# Patient Record
Sex: MALE | Race: WHITE | NOT HISPANIC OR LATINO | URBAN - METROPOLITAN AREA
[De-identification: names, ages, dates, MRNs, and addresses within clinical notes are randomized per-mention and may not be internally consistent; named-entity substitution may affect disease eponyms.]

---

## 2018-09-10 ENCOUNTER — EMERGENCY (EMERGENCY)
Facility: HOSPITAL | Age: 43
LOS: 0 days | Discharge: HOME | End: 2018-09-10
Attending: EMERGENCY MEDICINE | Admitting: EMERGENCY MEDICINE
Payer: MEDICAID

## 2018-09-10 VITALS
RESPIRATION RATE: 18 BRPM | OXYGEN SATURATION: 98 % | DIASTOLIC BLOOD PRESSURE: 68 MMHG | HEART RATE: 86 BPM | TEMPERATURE: 98 F | SYSTOLIC BLOOD PRESSURE: 121 MMHG

## 2018-09-10 VITALS
WEIGHT: 240.08 LBS | SYSTOLIC BLOOD PRESSURE: 136 MMHG | RESPIRATION RATE: 18 BRPM | DIASTOLIC BLOOD PRESSURE: 75 MMHG | OXYGEN SATURATION: 100 % | HEART RATE: 83 BPM | TEMPERATURE: 97 F | HEIGHT: 73 IN

## 2018-09-10 DIAGNOSIS — R11.0 NAUSEA: ICD-10-CM

## 2018-09-10 DIAGNOSIS — H53.149 VISUAL DISCOMFORT, UNSPECIFIED: ICD-10-CM

## 2018-09-10 DIAGNOSIS — R51 HEADACHE: ICD-10-CM

## 2018-09-10 DIAGNOSIS — G40.909 EPILEPSY, UNSPECIFIED, NOT INTRACTABLE, WITHOUT STATUS EPILEPTICUS: ICD-10-CM

## 2018-09-10 LAB
ALBUMIN SERPL ELPH-MCNC: 4.3 G/DL — SIGNIFICANT CHANGE UP (ref 3.3–5)
ALP SERPL-CCNC: 137 U/L — HIGH (ref 40–120)
ALT FLD-CCNC: 35 U/L — SIGNIFICANT CHANGE UP (ref 12–78)
ANION GAP SERPL CALC-SCNC: 10 MMOL/L — SIGNIFICANT CHANGE UP (ref 5–17)
APTT BLD: 31.2 SEC — SIGNIFICANT CHANGE UP (ref 27.5–37.4)
AST SERPL-CCNC: 35 U/L — SIGNIFICANT CHANGE UP (ref 15–37)
BASOPHILS # BLD AUTO: 0.04 K/UL — SIGNIFICANT CHANGE UP (ref 0–0.2)
BASOPHILS NFR BLD AUTO: 0.5 % — SIGNIFICANT CHANGE UP (ref 0–2)
BILIRUB SERPL-MCNC: 1.3 MG/DL — HIGH (ref 0.2–1.2)
BUN SERPL-MCNC: 18 MG/DL — SIGNIFICANT CHANGE UP (ref 7–23)
CALCIUM SERPL-MCNC: 9.3 MG/DL — SIGNIFICANT CHANGE UP (ref 8.5–10.1)
CHLORIDE SERPL-SCNC: 105 MMOL/L — SIGNIFICANT CHANGE UP (ref 96–108)
CO2 SERPL-SCNC: 23 MMOL/L — SIGNIFICANT CHANGE UP (ref 22–31)
CREAT SERPL-MCNC: 1.09 MG/DL — SIGNIFICANT CHANGE UP (ref 0.5–1.3)
EOSINOPHIL # BLD AUTO: 0.04 K/UL — SIGNIFICANT CHANGE UP (ref 0–0.5)
EOSINOPHIL NFR BLD AUTO: 0.5 % — SIGNIFICANT CHANGE UP (ref 0–6)
GLUCOSE SERPL-MCNC: 145 MG/DL — HIGH (ref 70–99)
HCT VFR BLD CALC: 48.1 % — SIGNIFICANT CHANGE UP (ref 39–50)
HGB BLD-MCNC: 15.9 G/DL — SIGNIFICANT CHANGE UP (ref 13–17)
IMM GRANULOCYTES NFR BLD AUTO: 0.5 % — SIGNIFICANT CHANGE UP (ref 0–1.5)
INR BLD: 1.05 RATIO — SIGNIFICANT CHANGE UP (ref 0.88–1.16)
LYMPHOCYTES # BLD AUTO: 0.63 K/UL — LOW (ref 1–3.3)
LYMPHOCYTES # BLD AUTO: 8.1 % — LOW (ref 13–44)
MCHC RBC-ENTMCNC: 27.6 PG — SIGNIFICANT CHANGE UP (ref 27–34)
MCHC RBC-ENTMCNC: 33.1 GM/DL — SIGNIFICANT CHANGE UP (ref 32–36)
MCV RBC AUTO: 83.5 FL — SIGNIFICANT CHANGE UP (ref 80–100)
MONOCYTES # BLD AUTO: 0.33 K/UL — SIGNIFICANT CHANGE UP (ref 0–0.9)
MONOCYTES NFR BLD AUTO: 4.2 % — SIGNIFICANT CHANGE UP (ref 2–14)
NEUTROPHILS # BLD AUTO: 6.7 K/UL — SIGNIFICANT CHANGE UP (ref 1.8–7.4)
NEUTROPHILS NFR BLD AUTO: 86.2 % — HIGH (ref 43–77)
NRBC # BLD: 0 /100 WBCS — SIGNIFICANT CHANGE UP (ref 0–0)
PLATELET # BLD AUTO: 156 K/UL — SIGNIFICANT CHANGE UP (ref 150–400)
POTASSIUM SERPL-MCNC: 4.6 MMOL/L — SIGNIFICANT CHANGE UP (ref 3.5–5.3)
POTASSIUM SERPL-SCNC: 4.6 MMOL/L — SIGNIFICANT CHANGE UP (ref 3.5–5.3)
PROT SERPL-MCNC: 7.7 GM/DL — SIGNIFICANT CHANGE UP (ref 6–8.3)
PROTHROM AB SERPL-ACNC: 11.3 SEC — SIGNIFICANT CHANGE UP (ref 9.8–12.7)
RBC # BLD: 5.76 M/UL — SIGNIFICANT CHANGE UP (ref 4.2–5.8)
RBC # FLD: 12.8 % — SIGNIFICANT CHANGE UP (ref 10.3–14.5)
SODIUM SERPL-SCNC: 138 MMOL/L — SIGNIFICANT CHANGE UP (ref 135–145)
WBC # BLD: 7.78 K/UL — SIGNIFICANT CHANGE UP (ref 3.8–10.5)
WBC # FLD AUTO: 7.78 K/UL — SIGNIFICANT CHANGE UP (ref 3.8–10.5)

## 2018-09-10 PROCEDURE — 70450 CT HEAD/BRAIN W/O DYE: CPT | Mod: 26

## 2018-09-10 PROCEDURE — 99285 EMERGENCY DEPT VISIT HI MDM: CPT

## 2018-09-10 RX ORDER — CYCLOBENZAPRINE HYDROCHLORIDE 10 MG/1
1 TABLET, FILM COATED ORAL
Qty: 15 | Refills: 0
Start: 2018-09-10 | End: 2018-09-14

## 2018-09-10 RX ORDER — SODIUM CHLORIDE 9 MG/ML
3 INJECTION INTRAMUSCULAR; INTRAVENOUS; SUBCUTANEOUS ONCE
Qty: 0 | Refills: 0 | Status: COMPLETED | OUTPATIENT
Start: 2018-09-10 | End: 2018-09-10

## 2018-09-10 RX ORDER — IBUPROFEN 200 MG
1 TABLET ORAL
Qty: 40 | Refills: 0
Start: 2018-09-10 | End: 2018-09-19

## 2018-09-10 RX ORDER — SODIUM CHLORIDE 9 MG/ML
1000 INJECTION INTRAMUSCULAR; INTRAVENOUS; SUBCUTANEOUS ONCE
Qty: 0 | Refills: 0 | Status: COMPLETED | OUTPATIENT
Start: 2018-09-10 | End: 2018-09-10

## 2018-09-10 RX ORDER — METOCLOPRAMIDE HCL 10 MG
10 TABLET ORAL ONCE
Qty: 0 | Refills: 0 | Status: COMPLETED | OUTPATIENT
Start: 2018-09-10 | End: 2018-09-10

## 2018-09-10 RX ORDER — KETOROLAC TROMETHAMINE 30 MG/ML
30 SYRINGE (ML) INJECTION ONCE
Qty: 0 | Refills: 0 | Status: DISCONTINUED | OUTPATIENT
Start: 2018-09-10 | End: 2018-09-10

## 2018-09-10 RX ADMIN — SODIUM CHLORIDE 1000 MILLILITER(S): 9 INJECTION INTRAMUSCULAR; INTRAVENOUS; SUBCUTANEOUS at 03:39

## 2018-09-10 RX ADMIN — Medication 30 MILLIGRAM(S): at 03:49

## 2018-09-10 RX ADMIN — Medication 10 MILLIGRAM(S): at 03:56

## 2018-09-10 RX ADMIN — SODIUM CHLORIDE 3 MILLILITER(S): 9 INJECTION INTRAMUSCULAR; INTRAVENOUS; SUBCUTANEOUS at 03:39

## 2018-09-10 NOTE — ED ADULT NURSE NOTE - OBJECTIVE STATEMENT
pt c/o mid frontal headache, +photophobia, + nausea since yesterday; took his prescribed meds (from neurologist) but did not help

## 2018-09-10 NOTE — ED PROVIDER NOTE - OBJECTIVE STATEMENT
43 y/o male in ED c/o intermittent worsening frontal HA with nausea x 3 days.   pt states on migraine meds by his neurologist but not working.   pt denies any fever, neck pain, cp, sob, v/d/abd pain.   states positive photophobia.

## 2018-09-10 NOTE — ED ADULT NURSE NOTE - NSIMPLEMENTINTERV_GEN_ALL_ED
Implemented All Universal Safety Interventions:  Thor to call system. Call bell, personal items and telephone within reach. Instruct patient to call for assistance. Room bathroom lighting operational. Non-slip footwear when patient is off stretcher. Physically safe environment: no spills, clutter or unnecessary equipment. Stretcher in lowest position, wheels locked, appropriate side rails in place.

## 2019-06-17 PROBLEM — R56.9 UNSPECIFIED CONVULSIONS: Chronic | Status: ACTIVE | Noted: 2018-09-10

## 2019-06-20 ENCOUNTER — APPOINTMENT (OUTPATIENT)
Dept: RHEUMATOLOGY | Facility: CLINIC | Age: 44
End: 2019-06-20
Payer: COMMERCIAL

## 2019-06-20 VITALS
HEART RATE: 72 BPM | DIASTOLIC BLOOD PRESSURE: 82 MMHG | HEIGHT: 72 IN | OXYGEN SATURATION: 98 % | SYSTOLIC BLOOD PRESSURE: 120 MMHG | BODY MASS INDEX: 36.03 KG/M2 | WEIGHT: 266 LBS

## 2019-06-20 DIAGNOSIS — M25.50 PAIN IN UNSPECIFIED JOINT: ICD-10-CM

## 2019-06-20 DIAGNOSIS — Z84.0 FAMILY HISTORY OF DISEASES OF THE SKIN AND SUBCUTANEOUS TISSUE: ICD-10-CM

## 2019-06-20 DIAGNOSIS — Z86.69 PERSONAL HISTORY OF OTHER DISEASES OF THE NERVOUS SYSTEM AND SENSE ORGANS: ICD-10-CM

## 2019-06-20 PROBLEM — Z00.00 ENCOUNTER FOR PREVENTIVE HEALTH EXAMINATION: Status: ACTIVE | Noted: 2019-06-20

## 2019-06-20 PROCEDURE — 36415 COLL VENOUS BLD VENIPUNCTURE: CPT

## 2019-06-20 PROCEDURE — 99204 OFFICE O/P NEW MOD 45 MIN: CPT | Mod: 25

## 2019-06-20 RX ORDER — SUMATRIPTAN 50 MG/1
TABLET, FILM COATED ORAL
Refills: 0 | Status: ACTIVE | COMMUNITY

## 2019-06-23 LAB
25(OH)D3 SERPL-MCNC: 24.6 NG/ML
ALBUMIN SERPL ELPH-MCNC: 4.9 G/DL
ALP BLD-CCNC: 97 U/L
ALT SERPL-CCNC: 19 U/L
ANION GAP SERPL CALC-SCNC: 14 MMOL/L
AST SERPL-CCNC: 17 U/L
BASOPHILS # BLD AUTO: 0.06 K/UL
BASOPHILS NFR BLD AUTO: 1.3 %
BILIRUB SERPL-MCNC: 1 MG/DL
BUN SERPL-MCNC: 24 MG/DL
CALCIUM SERPL-MCNC: 9.4 MG/DL
CHLORIDE SERPL-SCNC: 104 MMOL/L
CK SERPL-CCNC: 57 U/L
CO2 SERPL-SCNC: 25 MMOL/L
CREAT SERPL-MCNC: 0.97 MG/DL
CRP SERPL-MCNC: 0.11 MG/DL
ENA SS-A AB SER IA-ACNC: <0.2 AL
ENA SS-B AB SER IA-ACNC: <0.2 AL
EOSINOPHIL # BLD AUTO: 0.16 K/UL
EOSINOPHIL NFR BLD AUTO: 3.4 %
ERYTHROCYTE [SEDIMENTATION RATE] IN BLOOD BY WESTERGREN METHOD: 4 MM/HR
G6PD SER-CCNC: 14.2 U/G HGB
GLUCOSE SERPL-MCNC: 110 MG/DL
HBV SURFACE AG SER QL: NONREACTIVE
HCT VFR BLD CALC: 51.7 %
HCV AB SER QL: NONREACTIVE
HCV S/CO RATIO: 0.38 S/CO
HGB BLD-MCNC: 15.7 G/DL
IMM GRANULOCYTES NFR BLD AUTO: 0.4 %
LYMPHOCYTES # BLD AUTO: 0.99 K/UL
LYMPHOCYTES NFR BLD AUTO: 20.8 %
MAN DIFF?: NORMAL
MCHC RBC-ENTMCNC: 27.9 PG
MCHC RBC-ENTMCNC: 30.4 GM/DL
MCV RBC AUTO: 91.8 FL
MONOCYTES # BLD AUTO: 0.39 K/UL
MONOCYTES NFR BLD AUTO: 8.2 %
NEUTROPHILS # BLD AUTO: 3.14 K/UL
NEUTROPHILS NFR BLD AUTO: 65.9 %
PLATELET # BLD AUTO: 187 K/UL
POTASSIUM SERPL-SCNC: 4.4 MMOL/L
PROT SERPL-MCNC: 6.9 G/DL
RBC # BLD: 5.63 M/UL
RBC # FLD: 13.4 %
RHEUMATOID FACT SER QL: <10 IU/ML
SODIUM SERPL-SCNC: 143 MMOL/L
TSH SERPL-ACNC: 2.04 UIU/ML
WBC # FLD AUTO: 4.76 K/UL

## 2019-06-23 NOTE — REVIEW OF SYSTEMS
[As Noted in HPI] : as noted in HPI [Chills] : no chills [Fever] : no fever [Earache] : no earache [Eye Pain] : no eye pain [Red Eyes] : eyes not red [Nosebleeds] : no nosebleeds [Chest Pain] : no chest pain [Shortness Of Breath] : no shortness of breath [Abdominal Pain] : no abdominal pain [Dysuria] : no dysuria [Vomiting] : no vomiting [Confused] : no confusion [Suicidal] : not suicidal [Fainting] : no fainting [Muscle Weakness] : no muscle weakness [Proptosis] : no proptosis [Easy Bleeding] : no tendency for easy bleeding

## 2019-06-23 NOTE — HISTORY OF PRESENT ILLNESS
[FreeTextEntry1] : 43-year-old male here for the first time. Patient states that he has family history of psoriasis as well as maternal cousin w/ lupus. Patient states that he was told he has psoriasis rash at the scrotum per dermatologist in the past & perhaps at the elbows. States that the scrotum psoriasis is controlled with steroids and not much at the elbows at this time.\par States he notices a rash around his abdomen that improved and then for the past 2 months noted right lower extremity itchy rash not spreading improving and told likely folliculitis versus dermatitis by his Derm, Dr. Osorio in [place] with some ointment given and improving.\par This rash is not raised or petechia like at all.\par States he can notice some intermittent pain in his neck for the past few years without any paresthesias.\par States he can notice some right shoulder pain with rotation intermittently and has had history of dislocations of it in the past.\par States he notes some intermittent knee pain on and off without any swelling or redness or warmth of it.\par States he will monitor for any swelling or stiffness.\par Denies any lower back pain at all.\par States his has history of headaches and sees Dr. Jurado on triptan PRN then as well as on lamotrigene for history of seizures more than 10 years.\par Denies any history of blood clots or stroke.\par States he notices some intermittent headaches that do respond to analgesics including Advil and Excedrin.\par Denies any fever/chills, no ulcers, no dry eyes, + dry mouth at times, no raynaud's, no  symptoms at this time.\par States he has hx of IBS w/ GI and no IBD. \par

## 2019-06-23 NOTE — ASSESSMENT
[FreeTextEntry1] : 43-year-old male here for the first time w/ FH of CTD/ Psoriasis & states told in the past he has mild psoriasis of the scrotum w/ prior Derms as well as some Rt LE folliculitis/ dermatitis improving rash w/ Derm, Dr. Osorio. States he notes some arthralgias at times; intermittent neck pain; Rt shoulder pain & wants to be ruled out for any CTD/ inflammatory arthropathy.\par -No synovitis or dactylitis or enthesitis or effusion or LBP on exam noted today and advised to monitor & alert us if any concerns.\par -Rt LE folliculitis/ dermatitis: improving rash w/ Derm, Dr. Osorio & discussed he can get biopsy if it was not improving. Clinically does not look like a vasculitis rash at this time and knows to alert us if changes as discussed.\par -labs as below incld ESR, CRP, serologies, lyme, TSH\par \par Cervicalgia: tenderness in c-spine w/ rotation w/ good ROM w/o paresthesias \par -Referred for xray of c-spine to evaluate for structural changes, DDD\par \par Rt shoulder pain: intermittent and hx of dislocations of it  \par -Has full ROM in b/l shoulders, no pelvic/girdle stiffness and able to stand up without using her hands, no temporal pain/unremitting headaches, no vision changes, no jaw pain.\par -Referred for xray of left shoulder to evaluate for structural changes, eval for calcific tendonitis, high riding humerus/rotator cuff pathology\par -discussed he can consider steroid injection if needed\par \par -educated on symptoms to monitor for in detail and alert us if any concerns.\par -f/u in 10-14days w/ labs, xrays please\par

## 2019-06-23 NOTE — PHYSICAL EXAM
[General Appearance - Alert] : alert [General Appearance - Well Nourished] : well nourished [Sclera] : the sclera and conjunctiva were normal [Extraocular Movements] : extraocular movements were intact [Outer Ear] : the ears and nose were normal in appearance [Nasal Cavity] : the nasal mucosa and septum were normal [Neck Appearance] : the appearance of the neck was normal [] : the neck was supple [Auscultation Breath Sounds / Voice Sounds] : lungs were clear to auscultation bilaterally [Respiration, Rhythm And Depth] : normal respiratory rhythm and effort [Heart Rate And Rhythm] : heart rate was normal and rhythm regular [Heart Sounds] : normal S1 and S2 [Bowel Sounds] : normal bowel sounds [Abdomen Soft] : soft [Abdomen Tenderness] : non-tender [Cervical Lymph Nodes Enlarged Anterior Bilaterally] : anterior cervical [Supraclavicular Lymph Nodes Enlarged Bilaterally] : supraclavicular [No CVA Tenderness] : no ~M costovertebral angle tenderness [Motor Tone] : muscle strength and tone were normal [Cranial Nerves] : cranial nerves 2-12 were intact [Impaired Insight] : insight and judgment were intact [No Focal Deficits] : no focal deficits [Mood] : the mood was normal [FreeTextEntry1] : some excoriations noted on Rt LE and mildly at elbows

## 2019-06-23 NOTE — REASON FOR VISIT
[Consultation] : a consultation visit [FreeTextEntry1] : hx of psoriasis; neck pain; Rt shoulder pain; FH of psoriasis/SLE

## 2019-07-02 LAB
ANA SER IF-ACNC: NEGATIVE
CCP AB SER IA-ACNC: <8 UNITS
HLA-B27 RELATED AG QL: NORMAL
RF+CCP IGG SER-IMP: NEGATIVE

## 2019-07-16 LAB

## 2019-09-04 ENCOUNTER — APPOINTMENT (OUTPATIENT)
Dept: RHEUMATOLOGY | Facility: CLINIC | Age: 44
End: 2019-09-04
Payer: COMMERCIAL

## 2019-09-04 VITALS
BODY MASS INDEX: 37.11 KG/M2 | SYSTOLIC BLOOD PRESSURE: 102 MMHG | OXYGEN SATURATION: 98 % | DIASTOLIC BLOOD PRESSURE: 72 MMHG | HEIGHT: 72 IN | HEART RATE: 64 BPM | WEIGHT: 274 LBS

## 2019-09-04 DIAGNOSIS — M19.042 PRIMARY OSTEOARTHRITIS, RIGHT HAND: ICD-10-CM

## 2019-09-04 DIAGNOSIS — Z87.2 PERSONAL HISTORY OF DISEASES OF THE SKIN AND SUBCUTANEOUS TISSUE: ICD-10-CM

## 2019-09-04 DIAGNOSIS — M25.511 PAIN IN RIGHT SHOULDER: ICD-10-CM

## 2019-09-04 DIAGNOSIS — M19.041 PRIMARY OSTEOARTHRITIS, RIGHT HAND: ICD-10-CM

## 2019-09-04 DIAGNOSIS — E55.9 VITAMIN D DEFICIENCY, UNSPECIFIED: ICD-10-CM

## 2019-09-04 DIAGNOSIS — M54.2 CERVICALGIA: ICD-10-CM

## 2019-09-04 PROCEDURE — 99214 OFFICE O/P EST MOD 30 MIN: CPT

## 2019-09-04 RX ORDER — CHOLECALCIFEROL (VITAMIN D3) 1250 MCG
1.25 MG CAPSULE ORAL
Qty: 4 | Refills: 1 | Status: ACTIVE | COMMUNITY
Start: 2019-09-04 | End: 1900-01-01

## 2019-09-04 NOTE — HISTORY OF PRESENT ILLNESS
[FreeTextEntry1] : 43-year-old male here for the first follow up to review labs  &states he forgot to do xrays. Patient states that he has family history of psoriasis as well as maternal cousin w/ lupus. Patient states that he was told he has psoriasis rash at the scrotum per dermatologist in the past & perhaps at the elbows. States that the scrotum psoriasis is controlled with steroids and not much at the elbows at this time.\par States he notices a rash around his abdomen that improved and then for the past 2 months noted right lower extremity itchy rash not spreading improving and told likely folliculitis versus dermatitis by his Derm, Dr. Osorio in [place] with some ointment given and resolved today.\par States he can notice some intermittent pain in his neck for the past few years without any paresthesias.\par States he can notice some right shoulder pain with rotation intermittently and has had history of dislocations of it in the past and no pain in it today. \par States he notes some intermittent knee pain on and off without any swelling or redness or warmth of it & not today.\par Denies any lower back pain at all.\par States his has history of headaches and sees Dr. Jurado on triptan PRN then as well as on lamotrigene for history of seizures more than 10 years.\par Denies any history of blood clots or stroke.\par States he notices some intermittent headaches that do respond to analgesics including Advil and Excedrin.\par Denies any fever/chills, no ulcers, no dry eyes, + dry mouth at times, no raynaud's, no  symptoms at this time.\par States he has hx of IBS w/ GI and no IBD. \par \par

## 2019-09-04 NOTE — ASSESSMENT
[FreeTextEntry1] : 43-year-old male here for first follow up w/ FH of CTD/ Psoriasis & states told in the past he has mild psoriasis of the scrotum/elbow w/ prior Derms as well as some Rt LE folliculitis/ dermatitis resolved now. \par -No synovitis or dactylitis or enthesitis or effusion or LBP on exam noted today and advised to monitor & alert us if any concerns.\par -Rt LE folliculitis/ dermatitis: resolved rash now & clinically did not look like a vasculitis rash at this time and knows to alert us if changes as discussed.\par -labs as below 6/20/2019 incld normal ESR/ CRP, serologies all stable at this time as discussed in detail; low vit D \par \par OA hands: noted to have heberden nodes at DIPs with PIP hypertrophy without pain at this time and will monitor.\par \par Hypovitaminosis D: low vitD w/ prescription for vitD repletion sent as discussed.\par \par Cervicalgia: resolved today \par -has referral xray of c-spine to evaluate for structural changes, DDD if pain as discussed \par \par Rt shoulder pain: intermittent and hx of dislocations of it \par -Has full ROM in b/l shoulders, no pelvic/girdle stiffness and able to stand up without using her hands, no temporal pain/unremitting headaches, no vision changes, no jaw pain.\par -discussed he can consider steroid injection if needed\par \par -educated on symptoms to monitor for in detail and alert us if any concerns.\par -f/u in 1 yr PRN

## 2019-09-04 NOTE — REASON FOR VISIT
[Follow-Up: _____] : a [unfilled] follow-up visit [FreeTextEntry1] : hx of psoriasis; intermittent neck pain/aches; Rt shoulder pain; FH of psoriasis/SLE; review labs/symptoms \par  \par

## 2019-09-04 NOTE — PHYSICAL EXAM
[General Appearance - Alert] : alert [General Appearance - Well Nourished] : well nourished [Sclera] : the sclera and conjunctiva were normal [Extraocular Movements] : extraocular movements were intact [Outer Ear] : the ears and nose were normal in appearance [Nasal Cavity] : the nasal mucosa and septum were normal [Neck Appearance] : the appearance of the neck was normal [] : the neck was supple [Respiration, Rhythm And Depth] : normal respiratory rhythm and effort [Auscultation Breath Sounds / Voice Sounds] : lungs were clear to auscultation bilaterally [Heart Sounds] : normal S1 and S2 [Heart Rate And Rhythm] : heart rate was normal and rhythm regular [Bowel Sounds] : normal bowel sounds [Abdomen Soft] : soft [Abdomen Tenderness] : non-tender [Supraclavicular Lymph Nodes Enlarged Bilaterally] : supraclavicular [Cervical Lymph Nodes Enlarged Anterior Bilaterally] : anterior cervical [No CVA Tenderness] : no ~M costovertebral angle tenderness [No Spinal Tenderness] : no spinal tenderness [Motor Tone] : muscle strength and tone were normal [Cranial Nerves] : cranial nerves 2-12 were intact [No Focal Deficits] : no focal deficits [Impaired Insight] : insight and judgment were intact [Mood] : the mood was normal [FreeTextEntry1] : mild redness at left elbow

## 2020-10-22 ENCOUNTER — OUTPATIENT (OUTPATIENT)
Dept: OUTPATIENT SERVICES | Facility: HOSPITAL | Age: 45
LOS: 1 days | End: 2020-10-22
Payer: COMMERCIAL

## 2020-10-22 DIAGNOSIS — G40.109 LOCALIZATION-RELATED (FOCAL) (PARTIAL) SYMPTOMATIC EPILEPSY AND EPILEPTIC SYNDROMES WITH SIMPLE PARTIAL SEIZURES, NOT INTRACTABLE, WITHOUT STATUS EPILEPTICUS: ICD-10-CM

## 2020-10-22 PROCEDURE — 95816 EEG AWAKE AND DROWSY: CPT

## 2020-10-22 PROCEDURE — 95816 EEG AWAKE AND DROWSY: CPT | Mod: 26

## 2020-10-23 DIAGNOSIS — G40.109 LOCALIZATION-RELATED (FOCAL) (PARTIAL) SYMPTOMATIC EPILEPSY AND EPILEPTIC SYNDROMES WITH SIMPLE PARTIAL SEIZURES, NOT INTRACTABLE, WITHOUT STATUS EPILEPTICUS: ICD-10-CM

## 2021-09-05 ENCOUNTER — EMERGENCY (EMERGENCY)
Facility: HOSPITAL | Age: 46
LOS: 0 days | Discharge: HOME | End: 2021-09-05
Attending: EMERGENCY MEDICINE
Payer: COMMERCIAL

## 2021-09-05 VITALS
TEMPERATURE: 99 F | WEIGHT: 244.93 LBS | OXYGEN SATURATION: 100 % | RESPIRATION RATE: 16 BRPM | DIASTOLIC BLOOD PRESSURE: 75 MMHG | HEART RATE: 67 BPM | SYSTOLIC BLOOD PRESSURE: 122 MMHG | HEIGHT: 73 IN

## 2021-09-05 VITALS
DIASTOLIC BLOOD PRESSURE: 72 MMHG | SYSTOLIC BLOOD PRESSURE: 135 MMHG | HEART RATE: 70 BPM | OXYGEN SATURATION: 100 % | RESPIRATION RATE: 18 BRPM | TEMPERATURE: 98 F

## 2021-09-05 DIAGNOSIS — Z87.19 PERSONAL HISTORY OF OTHER DISEASES OF THE DIGESTIVE SYSTEM: ICD-10-CM

## 2021-09-05 DIAGNOSIS — K80.20 CALCULUS OF GALLBLADDER WITHOUT CHOLECYSTITIS WITHOUT OBSTRUCTION: ICD-10-CM

## 2021-09-05 DIAGNOSIS — G40.909 EPILEPSY, UNSPECIFIED, NOT INTRACTABLE, WITHOUT STATUS EPILEPTICUS: ICD-10-CM

## 2021-09-05 DIAGNOSIS — R10.9 UNSPECIFIED ABDOMINAL PAIN: ICD-10-CM

## 2021-09-05 DIAGNOSIS — R10.11 RIGHT UPPER QUADRANT PAIN: ICD-10-CM

## 2021-09-05 LAB
ADD ON TEST-SPECIMEN IN LAB: SIGNIFICANT CHANGE UP
ALBUMIN SERPL ELPH-MCNC: 4.4 G/DL — SIGNIFICANT CHANGE UP (ref 3.3–5)
ALP SERPL-CCNC: 169 U/L — HIGH (ref 40–120)
ALT FLD-CCNC: 115 U/L — HIGH (ref 12–78)
ANION GAP SERPL CALC-SCNC: 3 MMOL/L — LOW (ref 5–17)
AST SERPL-CCNC: 151 U/L — HIGH (ref 15–37)
BASOPHILS # BLD AUTO: 0.03 K/UL — SIGNIFICANT CHANGE UP (ref 0–0.2)
BASOPHILS NFR BLD AUTO: 0.5 % — SIGNIFICANT CHANGE UP (ref 0–2)
BILIRUB SERPL-MCNC: 2.4 MG/DL — HIGH (ref 0.2–1.2)
BUN SERPL-MCNC: 18 MG/DL — SIGNIFICANT CHANGE UP (ref 7–23)
CALCIUM SERPL-MCNC: 8.9 MG/DL — SIGNIFICANT CHANGE UP (ref 8.5–10.1)
CHLORIDE SERPL-SCNC: 108 MMOL/L — SIGNIFICANT CHANGE UP (ref 96–108)
CO2 SERPL-SCNC: 28 MMOL/L — SIGNIFICANT CHANGE UP (ref 22–31)
CREAT SERPL-MCNC: 1.02 MG/DL — SIGNIFICANT CHANGE UP (ref 0.5–1.3)
EOSINOPHIL # BLD AUTO: 0.07 K/UL — SIGNIFICANT CHANGE UP (ref 0–0.5)
EOSINOPHIL NFR BLD AUTO: 1.1 % — SIGNIFICANT CHANGE UP (ref 0–6)
GLUCOSE SERPL-MCNC: 89 MG/DL — SIGNIFICANT CHANGE UP (ref 70–99)
HCT VFR BLD CALC: 45.3 % — SIGNIFICANT CHANGE UP (ref 39–50)
HGB BLD-MCNC: 15 G/DL — SIGNIFICANT CHANGE UP (ref 13–17)
IMM GRANULOCYTES NFR BLD AUTO: 0.3 % — SIGNIFICANT CHANGE UP (ref 0–1.5)
LIDOCAIN IGE QN: 159 U/L — SIGNIFICANT CHANGE UP (ref 73–393)
LYMPHOCYTES # BLD AUTO: 0.84 K/UL — LOW (ref 1–3.3)
LYMPHOCYTES # BLD AUTO: 12.8 % — LOW (ref 13–44)
MCHC RBC-ENTMCNC: 28 PG — SIGNIFICANT CHANGE UP (ref 27–34)
MCHC RBC-ENTMCNC: 33.1 GM/DL — SIGNIFICANT CHANGE UP (ref 32–36)
MCV RBC AUTO: 84.5 FL — SIGNIFICANT CHANGE UP (ref 80–100)
MONOCYTES # BLD AUTO: 0.56 K/UL — SIGNIFICANT CHANGE UP (ref 0–0.9)
MONOCYTES NFR BLD AUTO: 8.5 % — SIGNIFICANT CHANGE UP (ref 2–14)
NEUTROPHILS # BLD AUTO: 5.06 K/UL — SIGNIFICANT CHANGE UP (ref 1.8–7.4)
NEUTROPHILS NFR BLD AUTO: 76.8 % — SIGNIFICANT CHANGE UP (ref 43–77)
PLATELET # BLD AUTO: 172 K/UL — SIGNIFICANT CHANGE UP (ref 150–400)
POTASSIUM SERPL-MCNC: 4.6 MMOL/L — SIGNIFICANT CHANGE UP (ref 3.5–5.3)
POTASSIUM SERPL-SCNC: 4.6 MMOL/L — SIGNIFICANT CHANGE UP (ref 3.5–5.3)
PROT SERPL-MCNC: 7.4 GM/DL — SIGNIFICANT CHANGE UP (ref 6–8.3)
RBC # BLD: 5.36 M/UL — SIGNIFICANT CHANGE UP (ref 4.2–5.8)
RBC # FLD: 13.2 % — SIGNIFICANT CHANGE UP (ref 10.3–14.5)
SODIUM SERPL-SCNC: 139 MMOL/L — SIGNIFICANT CHANGE UP (ref 135–145)
WBC # BLD: 6.58 K/UL — SIGNIFICANT CHANGE UP (ref 3.8–10.5)
WBC # FLD AUTO: 6.58 K/UL — SIGNIFICANT CHANGE UP (ref 3.8–10.5)

## 2021-09-05 PROCEDURE — 76705 ECHO EXAM OF ABDOMEN: CPT

## 2021-09-05 PROCEDURE — 76705 ECHO EXAM OF ABDOMEN: CPT | Mod: 26

## 2021-09-05 PROCEDURE — 80053 COMPREHEN METABOLIC PANEL: CPT

## 2021-09-05 PROCEDURE — 36415 COLL VENOUS BLD VENIPUNCTURE: CPT

## 2021-09-05 PROCEDURE — 85025 COMPLETE CBC W/AUTO DIFF WBC: CPT

## 2021-09-05 PROCEDURE — 83690 ASSAY OF LIPASE: CPT

## 2021-09-05 PROCEDURE — 99285 EMERGENCY DEPT VISIT HI MDM: CPT

## 2021-09-05 PROCEDURE — 99284 EMERGENCY DEPT VISIT MOD MDM: CPT | Mod: 25

## 2021-09-05 PROCEDURE — 82248 BILIRUBIN DIRECT: CPT

## 2021-09-05 RX ORDER — IBUPROFEN 200 MG
1 TABLET ORAL
Qty: 20 | Refills: 0
Start: 2021-09-05 | End: 2021-09-09

## 2021-09-05 RX ORDER — CYCLOBENZAPRINE HYDROCHLORIDE 10 MG/1
1 TABLET, FILM COATED ORAL
Qty: 15 | Refills: 0
Start: 2021-09-05 | End: 2021-09-09

## 2021-09-05 NOTE — CONSULT NOTE ADULT - SUBJECTIVE AND OBJECTIVE BOX
46 y/o M with a PMHx of seizure, gallstones presents to the ED c/o abd pain around 1030 AM today. States that he has been having episodes of abd pain chronically, but states that today's episode has been persistent so her presents to the ED for further evaluation.   No fever. No hx of abd surgeries.  Pt told to come to the ED to get an ultrasound. U/s showed: Cholelithiasis. no signs of acute cholecystitis .    ICU Vital Signs Last 24 Hrs  T(C): 37 (05 Sep 2021 13:23), Max: 37 (05 Sep 2021 13:23)  T(F): 98.6 (05 Sep 2021 13:23), Max: 98.6 (05 Sep 2021 13:23)  HR: 67 (05 Sep 2021 13:23) (67 - 67)  BP: 122/75 (05 Sep 2021 13:23) (122/75 - 122/75)  BP(mean): 86 (05 Sep 2021 13:23) (86 - 86)  ABP: --  ABP(mean): --  RR: 16 (05 Sep 2021 13:23) (16 - 16)  SpO2: 100% (05 Sep 2021 13:23) (100% - 100%)    · CONSTITUTIONAL: well appearing and in no apparent distress.  · EYES: clear bilaterally.  Pupils equal, round, and reactive to light.  · ENMT: Nasal mucosa clear.  Mouth with normal mucosa  Throat has no vesicles, no oropharyngeal exudates and uvula is midline.  · CARDIAC: normal rate, regular rhythm, and no murmur.  · RESPIRATORY: breath sounds clear and equal bilaterally.  · GASTROINTESTINAL: abdomen soft and non-distended. Kohler's sign negative.                           15.0   6.58  )-----------( 172      ( 05 Sep 2021 14:32 )             45.3   09-05    139  |  108  |  18  ----------------------------<  89  4.6   |  28  |  1.02    Ca    8.9      05 Sep 2021 14:32        TPro  7.4  /  Alb  4.4  /  TBili  2.4<H>  /  DBili  0.6<H>  /  AST  151<H>  /  ALT  115<H>  /  AlkPhos  169<H>  09-05    Imaging:        EXAM: US ABDOMEN RT UPR QUADRANT      PROCEDURE DATE: 09/05/2021        INTERPRETATION: CLINICAL INFORMATION: Right upper quadrant pain    COMPARISON: None available.    TECHNIQUE: Sonography of the right upper quadrant.    FINDINGS:    Liver: Within normal limits.  Bile ducts: Normal caliber. Common bile duct measures 4 mm.  Gallbladder: The gallbladder contains multiple shadowing gallstones. The visualized wall demonstrates normal thickness. No pericholecystic fluid identified. The patient expressed pain directly over the gallbladder.  Pancreas: Obscured by overlying bowel gas.  Right kidney: 12.4 cm. No hydronephrosis.  Ascites: None.  IVC: Visualized portions are within normal limits.    IMPRESSION:    Cholelithiasis. The patient expressed pain directly over the gallbladder during the exam, though there were no additional sonographic findings to suggest acute cholecystitis. Consider further evaluation with HIDA scan.

## 2021-09-05 NOTE — ED ADULT TRIAGE NOTE - CHIEF COMPLAINT QUOTE
Pt reports to ED for right sided abdominal pain.  Pt states pain started this morning was 8/10 pain and now is 2/10 pain.  PMH of gallstones/gallbladder attacks.  Dr. Laura GI doctor, pt. called him today and was told to come to ED and get ultrasound.

## 2021-09-05 NOTE — ED STATDOCS - NSFOLLOWUPINSTRUCTIONS_ED_ALL_ED_FT
[facility]  	                       GALLSTONES - AfterCare(R) Instructions(ER/ED)           Gallstones    WHAT YOU NEED TO KNOW:    Gallstones are hard substances that form in your gallbladder or bile duct. Your gallbladder and bile duct are located on the right side of your abdomen, near your liver. Your gallbladder stores bile. Bile helps break down the fat that you eat. Your gallbladder also helps remove certain chemicals from your body.    Gallstones         DISCHARGE INSTRUCTIONS:    Return to the emergency department if:   •You have a fever and chills.      •Your skin or eyes turn yellow.      •You have severe pain in your upper abdomen, just below the right ribcage.      Contact your healthcare provider if:   •You have nausea and are vomiting.      •Your urine is dark.      •You have denis-colored bowel movements.      •You have questions or concerns about your condition or care.      Medicines:   •Prescription pain medicine may be given. Ask your healthcare provider how to take this medicine safely.      •Take your medicine as directed. Contact your healthcare provider if you think your medicine is not helping or if you have side effects. Tell him or her if you are allergic to any medicine. Keep a list of the medicines, vitamins, and herbs you take. Include the amounts, and when and why you take them. Bring the list or the pill bottles to follow-up visits. Carry your medicine list with you in case of an emergency.      What you can do to manage or prevent gallstones:   •Eat a variety of healthy foods. This may help you have more energy and heal faster. Healthy foods include fruits, vegetables, whole-grain breads, low-fat dairy products, beans, lean meat, and fish. Ask if you need to be on a special diet. Try to eat regular meals during the day. This will help your gallbladder empty.      •Exercise as directed. Talk to your healthcare provider about the best exercise plan for you. Exercise can help you lose weight and improve your health.      •Manage your weight. If you are overweight, it is important to reach a healthy weight. You will need to lose weight slowly because rapid weight loss can increase your risk for gallstones. Talk to your healthcare provider about your weight. He or she can help you create a safe weight loss plan if you need to lose weight.      Follow up with your healthcare provider as directed: Write down your questions so you remember to ask them during your visits.        © Copyright [employer] 2021           back to top                          © Copyright [employer] 2021                       [facility]  	                       BILIARY COLIC - AfterCare(R) Instructions(ER/ED)           Biliary Colic    WHAT YOU NEED TO KNOW:    Biliary colic is severe pain in your upper abdomen caused by a gallbladder problem. Your gallbladder stores bile, which helps break down the fats that you eat.     Gallbladder         DISCHARGE INSTRUCTIONS:    Medicines:   •Medicines can help decrease pain and muscle spasms. You may also need medicine to calm your stomach and stop vomiting.      •Take your medicine as directed. Contact your healthcare provider if you think your medicine is not helping or you have side effects. Tell him if you are allergic to any medicine. Keep a list of the medicines, vitamins, and herbs you take. Include the amounts, and when and why you take them. Bring the list or the pill bottles to follow-up visits. Carry your medicine list with you in case of an emergency.      Avoid alcohol: Alcohol can damage your gallbladder and make your symptoms worse.    Maintain a healthy weight: Ask your healthcare provider how much you should weigh. Ask him to help you create a weight loss plan if you are overweight.     Eat a variety of healthy foods: Healthy foods include fruits, vegetables, whole-grain breads, low-fat dairy products, beans, lean meats, and fish. Foods that are high in fiber and low in fat and cholesterol may decrease your symptoms. Ask if you need to be on a special diet.    Exercise: Ask your healthcare provider about the best exercise plan for you. Exercise may help improve your symptoms.    Follow up with your healthcare provider as directed: Bring a list of any questions you have so you remember to ask them during your visits.     Contact your healthcare provider if:   •You have a fever.      •Your pain gets worse, even after you take medicine.      •You have nausea or are vomiting.      •Your skin or eyes are yellow.      •You have questions or concerns about your condition or care.      Return to the emergency department if:   •You have severe pain.      •You feel like you are going to faint.      •You are short of breath.         © [employer] 2021           back to top                          © Copyright [employer] 2021

## 2021-09-05 NOTE — ED STATDOCS - PATIENT PORTAL LINK FT
You can access the FollowMyHealth Patient Portal offered by [facility] by registering at the following website: [email or web address]. By joining [facility]’s FollowMyHealth portal, you will also be able to view your health information using other applications (apps) compatible with our system.

## 2021-09-05 NOTE — CONSULT NOTE ADULT - ASSESSMENT
46 y/o M with a PMHx of seizure, gallstones presents to the ED c/o abd pain around 1030 AM today. States that he has been having episodes of abd pain chronically,  U/s showed: Cholelithiasis. no signs of acute cholecystitis .      Plan:  - Patient doesn't have signs of acute cholecystitis.  - Patient needs to follow up with Dr. Hamilton in his office to be scheduled for elective cholecystectomy .  - Pain control as needed.  - Avoid fatty food.    Plan was discussed with Dr. Hamilton

## 2021-09-05 NOTE — ED STATDOCS - CARE PROVIDER_API CALL
Butch Hamilton; YOAN)  Surgery  [address], [address]  [place] [address]  Phone: [phone]  Fax: [phone]  Follow Up Time:

## 2021-09-05 NOTE — ED STATDOCS - OBJECTIVE STATEMENT
44 y/o M with a PMHx of seizure, gallstones presents to the ED c/o abd pain around 1030 AM today. States that he has been having episodes of abd pain chronically, but states that today's episode has been persistent so her presents to the ED for further evaluation.   No fever. No hx of abd surgeries.  Pt told to come to the ED to get an ultrasound. Denies have gallbladder removed.  GI:  Dr. Gadiel Schaeffer

## 2021-09-05 NOTE — ED STATDOCS - PROGRESS NOTE DETAILS
44 y/o Male with PMHx of Seizures and Gallstones presents to ED c/o RUQ pain since 10:30am.  Pt has had similar pain Barbara Mckinney PA-C

## 2021-09-05 NOTE — ED STATDOCS - ATTENDING CONTRIBUTION TO CARE
I, Babs Greenfield MD,  performed the initial face to face bedside interview with this patient regarding history of present illness, review of symptoms and relevant past medical, social and family history.  I completed an independent physical examination.  I was the initial provider who evaluated this patient. I have signed out the follow up of any pending tests (i.e. labs, radiological studies) to the ACP.  I have communicated the patient’s plan of care and disposition with the ACP.  The history, relevant review of systems, past medical and surgical history, medical decision making, and physical examination was documented by the scribe in my presence and I attest to the accuracy of the documentation.

## 2021-11-17 ENCOUNTER — INPATIENT (INPATIENT)
Facility: HOSPITAL | Age: 46
LOS: 0 days | Discharge: HOME | DRG: 263 | End: 2021-11-18
Attending: SURGERY | Admitting: SURGERY
Payer: COMMERCIAL

## 2021-11-17 ENCOUNTER — RESULT REVIEW (OUTPATIENT)
Age: 46
End: 2021-11-17

## 2021-11-17 VITALS
WEIGHT: 250 LBS | HEIGHT: 73 IN | OXYGEN SATURATION: 100 % | SYSTOLIC BLOOD PRESSURE: 110 MMHG | RESPIRATION RATE: 18 BRPM | HEART RATE: 69 BPM | DIASTOLIC BLOOD PRESSURE: 71 MMHG | TEMPERATURE: 98 F

## 2021-11-17 DIAGNOSIS — K80.20 CALCULUS OF GALLBLADDER WITHOUT CHOLECYSTITIS WITHOUT OBSTRUCTION: ICD-10-CM

## 2021-11-17 LAB
ABO RH CONFIRMATION: SIGNIFICANT CHANGE UP
ALBUMIN SERPL ELPH-MCNC: 4 G/DL — SIGNIFICANT CHANGE UP (ref 3.3–5)
ALP SERPL-CCNC: 106 U/L — SIGNIFICANT CHANGE UP (ref 40–120)
ALT FLD-CCNC: 44 U/L — SIGNIFICANT CHANGE UP (ref 12–78)
ANION GAP SERPL CALC-SCNC: 3 MMOL/L — LOW (ref 5–17)
APTT BLD: 31.3 SEC — SIGNIFICANT CHANGE UP (ref 27.5–35.5)
AST SERPL-CCNC: 24 U/L — SIGNIFICANT CHANGE UP (ref 15–37)
BASOPHILS # BLD AUTO: 0.04 K/UL — SIGNIFICANT CHANGE UP (ref 0–0.2)
BASOPHILS NFR BLD AUTO: 1 % — SIGNIFICANT CHANGE UP (ref 0–2)
BILIRUB SERPL-MCNC: 1.1 MG/DL — SIGNIFICANT CHANGE UP (ref 0.2–1.2)
BLD GP AB SCN SERPL QL: SIGNIFICANT CHANGE UP
BUN SERPL-MCNC: 18 MG/DL — SIGNIFICANT CHANGE UP (ref 7–23)
CALCIUM SERPL-MCNC: 9 MG/DL — SIGNIFICANT CHANGE UP (ref 8.5–10.1)
CHLORIDE SERPL-SCNC: 105 MMOL/L — SIGNIFICANT CHANGE UP (ref 96–108)
CO2 SERPL-SCNC: 34 MMOL/L — HIGH (ref 22–31)
CREAT SERPL-MCNC: 1.09 MG/DL — SIGNIFICANT CHANGE UP (ref 0.5–1.3)
EOSINOPHIL # BLD AUTO: 0.13 K/UL — SIGNIFICANT CHANGE UP (ref 0–0.5)
EOSINOPHIL NFR BLD AUTO: 3.1 % — SIGNIFICANT CHANGE UP (ref 0–6)
GLUCOSE SERPL-MCNC: 90 MG/DL — SIGNIFICANT CHANGE UP (ref 70–99)
HCT VFR BLD CALC: 45.9 % — SIGNIFICANT CHANGE UP (ref 39–50)
HGB BLD-MCNC: 15.1 G/DL — SIGNIFICANT CHANGE UP (ref 13–17)
IMM GRANULOCYTES NFR BLD AUTO: 0.2 % — SIGNIFICANT CHANGE UP (ref 0–1.5)
INR BLD: 1.02 RATIO — SIGNIFICANT CHANGE UP (ref 0.88–1.16)
LIDOCAIN IGE QN: 129 U/L — SIGNIFICANT CHANGE UP (ref 73–393)
LYMPHOCYTES # BLD AUTO: 1.07 K/UL — SIGNIFICANT CHANGE UP (ref 1–3.3)
LYMPHOCYTES # BLD AUTO: 25.4 % — SIGNIFICANT CHANGE UP (ref 13–44)
MCHC RBC-ENTMCNC: 28.1 PG — SIGNIFICANT CHANGE UP (ref 27–34)
MCHC RBC-ENTMCNC: 32.9 GM/DL — SIGNIFICANT CHANGE UP (ref 32–36)
MCV RBC AUTO: 85.5 FL — SIGNIFICANT CHANGE UP (ref 80–100)
MONOCYTES # BLD AUTO: 0.39 K/UL — SIGNIFICANT CHANGE UP (ref 0–0.9)
MONOCYTES NFR BLD AUTO: 9.3 % — SIGNIFICANT CHANGE UP (ref 2–14)
NEUTROPHILS # BLD AUTO: 2.57 K/UL — SIGNIFICANT CHANGE UP (ref 1.8–7.4)
NEUTROPHILS NFR BLD AUTO: 61 % — SIGNIFICANT CHANGE UP (ref 43–77)
PLATELET # BLD AUTO: 175 K/UL — SIGNIFICANT CHANGE UP (ref 150–400)
POTASSIUM SERPL-MCNC: 4.5 MMOL/L — SIGNIFICANT CHANGE UP (ref 3.5–5.3)
POTASSIUM SERPL-SCNC: 4.5 MMOL/L — SIGNIFICANT CHANGE UP (ref 3.5–5.3)
PROT SERPL-MCNC: 7 GM/DL — SIGNIFICANT CHANGE UP (ref 6–8.3)
PROTHROM AB SERPL-ACNC: 11.8 SEC — SIGNIFICANT CHANGE UP (ref 10.6–13.6)
RBC # BLD: 5.37 M/UL — SIGNIFICANT CHANGE UP (ref 4.2–5.8)
RBC # FLD: 12.6 % — SIGNIFICANT CHANGE UP (ref 10.3–14.5)
SARS-COV-2 RNA SPEC QL NAA+PROBE: SIGNIFICANT CHANGE UP
SODIUM SERPL-SCNC: 142 MMOL/L — SIGNIFICANT CHANGE UP (ref 135–145)
WBC # BLD: 4.21 K/UL — SIGNIFICANT CHANGE UP (ref 3.8–10.5)
WBC # FLD AUTO: 4.21 K/UL — SIGNIFICANT CHANGE UP (ref 3.8–10.5)

## 2021-11-17 PROCEDURE — 88304 TISSUE EXAM BY PATHOLOGIST: CPT | Mod: 26

## 2021-11-17 PROCEDURE — 85025 COMPLETE CBC W/AUTO DIFF WBC: CPT

## 2021-11-17 PROCEDURE — 83690 ASSAY OF LIPASE: CPT

## 2021-11-17 PROCEDURE — 36415 COLL VENOUS BLD VENIPUNCTURE: CPT

## 2021-11-17 PROCEDURE — 85730 THROMBOPLASTIN TIME PARTIAL: CPT

## 2021-11-17 PROCEDURE — 99285 EMERGENCY DEPT VISIT HI MDM: CPT

## 2021-11-17 PROCEDURE — 80048 BASIC METABOLIC PNL TOTAL CA: CPT

## 2021-11-17 PROCEDURE — 87635 SARS-COV-2 COVID-19 AMP PRB: CPT

## 2021-11-17 PROCEDURE — 86769 SARS-COV-2 COVID-19 ANTIBODY: CPT

## 2021-11-17 PROCEDURE — 80053 COMPREHEN METABOLIC PANEL: CPT

## 2021-11-17 PROCEDURE — 85610 PROTHROMBIN TIME: CPT

## 2021-11-17 PROCEDURE — 86850 RBC ANTIBODY SCREEN: CPT

## 2021-11-17 PROCEDURE — C9399: CPT

## 2021-11-17 PROCEDURE — 86900 BLOOD TYPING SEROLOGIC ABO: CPT

## 2021-11-17 PROCEDURE — 88304 TISSUE EXAM BY PATHOLOGIST: CPT

## 2021-11-17 PROCEDURE — 86901 BLOOD TYPING SEROLOGIC RH(D): CPT

## 2021-11-17 RX ORDER — OXYCODONE HYDROCHLORIDE 5 MG/1
10 TABLET ORAL ONCE
Refills: 0 | Status: DISCONTINUED | OUTPATIENT
Start: 2021-11-17 | End: 2021-11-17

## 2021-11-17 RX ORDER — OXYCODONE HYDROCHLORIDE 5 MG/1
10 TABLET ORAL EVERY 4 HOURS
Refills: 0 | Status: DISCONTINUED | OUTPATIENT
Start: 2021-11-17 | End: 2021-11-18

## 2021-11-17 RX ORDER — LAMOTRIGINE 25 MG/1
400 TABLET, ORALLY DISINTEGRATING ORAL AT BEDTIME
Refills: 0 | Status: DISCONTINUED | OUTPATIENT
Start: 2021-11-17 | End: 2021-11-18

## 2021-11-17 RX ORDER — RIZATRIPTAN BENZOATE 5 MG/1
1 TABLET ORAL
Qty: 0 | Refills: 0 | DISCHARGE

## 2021-11-17 RX ORDER — OXYCODONE HYDROCHLORIDE 5 MG/1
5 TABLET ORAL EVERY 4 HOURS
Refills: 0 | Status: DISCONTINUED | OUTPATIENT
Start: 2021-11-17 | End: 2021-11-17

## 2021-11-17 RX ORDER — LIRAGLUTIDE 6 MG/ML
0 INJECTION SUBCUTANEOUS
Qty: 0 | Refills: 0 | DISCHARGE

## 2021-11-17 RX ORDER — CHOLECALCIFEROL (VITAMIN D3) 125 MCG
1 CAPSULE ORAL
Qty: 0 | Refills: 0 | DISCHARGE

## 2021-11-17 RX ORDER — HYDROMORPHONE HYDROCHLORIDE 2 MG/ML
1 INJECTION INTRAMUSCULAR; INTRAVENOUS; SUBCUTANEOUS EVERY 4 HOURS
Refills: 0 | Status: DISCONTINUED | OUTPATIENT
Start: 2021-11-17 | End: 2021-11-17

## 2021-11-17 RX ORDER — HYDROMORPHONE HYDROCHLORIDE 2 MG/ML
1 INJECTION INTRAMUSCULAR; INTRAVENOUS; SUBCUTANEOUS EVERY 4 HOURS
Refills: 0 | Status: DISCONTINUED | OUTPATIENT
Start: 2021-11-17 | End: 2021-11-18

## 2021-11-17 RX ORDER — ENOXAPARIN SODIUM 100 MG/ML
40 INJECTION SUBCUTANEOUS DAILY
Refills: 0 | Status: DISCONTINUED | OUTPATIENT
Start: 2021-11-17 | End: 2021-11-18

## 2021-11-17 RX ORDER — FOLIC ACID 0.8 MG
1 TABLET ORAL
Qty: 0 | Refills: 0 | DISCHARGE

## 2021-11-17 RX ORDER — ASCORBIC ACID 60 MG
1 TABLET,CHEWABLE ORAL
Qty: 0 | Refills: 0 | DISCHARGE

## 2021-11-17 RX ORDER — SODIUM CHLORIDE 9 MG/ML
1000 INJECTION, SOLUTION INTRAVENOUS
Refills: 0 | Status: DISCONTINUED | OUTPATIENT
Start: 2021-11-17 | End: 2021-11-18

## 2021-11-17 RX ORDER — CEFOTETAN DISODIUM 1 G
2 VIAL (EA) INJECTION EVERY 12 HOURS
Refills: 0 | Status: DISCONTINUED | OUTPATIENT
Start: 2021-11-17 | End: 2021-11-18

## 2021-11-17 RX ORDER — SODIUM CHLORIDE 9 MG/ML
1000 INJECTION, SOLUTION INTRAVENOUS
Refills: 0 | Status: DISCONTINUED | OUTPATIENT
Start: 2021-11-17 | End: 2021-11-17

## 2021-11-17 RX ORDER — ALPRAZOLAM 0.25 MG
0.5 TABLET ORAL AT BEDTIME
Refills: 0 | Status: DISCONTINUED | OUTPATIENT
Start: 2021-11-17 | End: 2021-11-18

## 2021-11-17 RX ORDER — ONDANSETRON 8 MG/1
4 TABLET, FILM COATED ORAL ONCE
Refills: 0 | Status: DISCONTINUED | OUTPATIENT
Start: 2021-11-17 | End: 2021-11-17

## 2021-11-17 RX ORDER — ONDANSETRON 8 MG/1
4 TABLET, FILM COATED ORAL EVERY 6 HOURS
Refills: 0 | Status: DISCONTINUED | OUTPATIENT
Start: 2021-11-17 | End: 2021-11-18

## 2021-11-17 RX ORDER — OXYCODONE HYDROCHLORIDE 5 MG/1
5 TABLET ORAL EVERY 4 HOURS
Refills: 0 | Status: DISCONTINUED | OUTPATIENT
Start: 2021-11-17 | End: 2021-11-18

## 2021-11-17 RX ORDER — FENTANYL CITRATE 50 UG/ML
50 INJECTION INTRAVENOUS
Refills: 0 | Status: DISCONTINUED | OUTPATIENT
Start: 2021-11-17 | End: 2021-11-17

## 2021-11-17 RX ADMIN — OXYCODONE HYDROCHLORIDE 10 MILLIGRAM(S): 5 TABLET ORAL at 22:07

## 2021-11-17 RX ADMIN — SODIUM CHLORIDE 100 MILLILITER(S): 9 INJECTION, SOLUTION INTRAVENOUS at 17:19

## 2021-11-17 RX ADMIN — FENTANYL CITRATE 50 MICROGRAM(S): 50 INJECTION INTRAVENOUS at 21:54

## 2021-11-17 NOTE — ED PROVIDER NOTE - OBJECTIVE STATEMENT
44 y/o male with PMHx of gallstones presents to the ED c/o intermittent right upper abdominal pain for years. States his episodes of pain would start after eating. States he followed-up with Dr. Carranza and told he had gall-stones, was told to come in today for possible surgery. Had an US in September. Denies fevers, chills, nausea, vomiting.

## 2021-11-17 NOTE — H&P ADULT - HISTORY OF PRESENT ILLNESS
44 yo male with hx of gallstones and many years of recurrent biliary colic, presents to ER with severe gallbladder attack. Pt last had severe attack on Labor day weekend. Now presents with post prandial pain, radiating to back, associated with nausea. Was to be scheduled initially for surgery, but today's attack worse than prior ones. Had ultrasound at [facility] in August showing gallbladder packed with stones.

## 2021-11-17 NOTE — ED PROVIDER NOTE - CLINICAL SUMMARY MEDICAL DECISION MAKING FREE TEXT BOX
Pt with mild tenderness, recurrent likely related to biliary colic. Will discuss with Dr. Carranza for further evaluation.

## 2021-11-17 NOTE — ED ADULT NURSE NOTE - OBJECTIVE STATEMENT
Pt is 45yr old 0male presents to the Ed with c/o intermittent RUQ pain x years. Sent in for admission with Dr Carranza.

## 2021-11-17 NOTE — ED ADULT NURSE REASSESSMENT NOTE - NS ED NURSE REASSESS COMMENT FT1
Received pt from ED nurse. Report obtained. Pt stable. Pt waiting for surgery. Denies pain. VS WNL. Safety and comfort measures done.

## 2021-11-17 NOTE — H&P ADULT - NSHPPHYSICALEXAM_GEN_ALL_CORE
VSS  46 yo male obese in NAD  HEENT- pupils equal sclerae anicteric  Neck- no JVD, trache midline  Lungs- clear  Cor- RRR  Abd- + BS soft tender RUQ, guards to deep palpation, + Kohler sign  Ext- no edema  Neuro- no deficits, A and O X 3

## 2021-11-17 NOTE — ED PROVIDER NOTE - PHYSICAL EXAMINATION
Constitutional: NAD, well appearing  HEENT: no rhinorrhea, PERRL, no oropharyngeal erythema or exudates, midline uvula.  TMs clear.  CVS:  RRR, no m/r/g  Resp:  CTAB  GI: +Mild tenderness RUQ, soft, nd.   MSK:  no restriction to rom, full ROM to all extremities  Neuro:  A&Ox3, 5/5 strength to all extremities,  SILT to all extremities  Skin: no rash  psych: clear thought content  Heme/lymph:  No LAD

## 2021-11-17 NOTE — ED PROVIDER NOTE - NS ED ROS FT
Constitutional: nad, well appearing  HEENT:  no nasal congestion, eye drainage or ear pain.    CVS:  no cp  Resp:  No sob, no cough  GI:  no nausea or vomiting. +abdominal pain  :  no dysuria  MSK: no joint pain or limited ROM  Skin: no rash  Neuro: no change in mental status or level of consciousness  Heme/lymph: no bleeding Constitutional: nad, well appearing. No fever or chills.  HEENT:  no nasal congestion, eye drainage or ear pain.    CVS:  no cp  Resp:  No sob, no cough  GI:  no nausea or vomiting. +abdominal pain  :  no dysuria  MSK: no joint pain or limited ROM  Skin: no rash  Neuro: no change in mental status or level of consciousness  Heme/lymph: no bleeding

## 2021-11-17 NOTE — ED PROVIDER NOTE - PROGRESS NOTE DETAILS
Pt with known biliary colic,  to be admitted for operative management.  Further care per inpatient treatment team.

## 2021-11-17 NOTE — ED ADULT TRIAGE NOTE - CHIEF COMPLAINT QUOTE
Patient comes to ED for abdominal pain for a few years. patient was sent in by MD welsh for possible surgery. denies any n/v/d

## 2021-11-18 ENCOUNTER — TRANSCRIPTION ENCOUNTER (OUTPATIENT)
Age: 46
End: 2021-11-18

## 2021-11-18 VITALS
HEART RATE: 99 BPM | TEMPERATURE: 97 F | OXYGEN SATURATION: 94 % | RESPIRATION RATE: 18 BRPM | DIASTOLIC BLOOD PRESSURE: 72 MMHG | SYSTOLIC BLOOD PRESSURE: 116 MMHG

## 2021-11-18 LAB
ANION GAP SERPL CALC-SCNC: 10 MMOL/L — SIGNIFICANT CHANGE UP (ref 5–17)
BASOPHILS # BLD AUTO: 0.01 K/UL — SIGNIFICANT CHANGE UP (ref 0–0.2)
BASOPHILS NFR BLD AUTO: 0.1 % — SIGNIFICANT CHANGE UP (ref 0–2)
BUN SERPL-MCNC: 23 MG/DL — SIGNIFICANT CHANGE UP (ref 7–23)
CALCIUM SERPL-MCNC: 8.6 MG/DL — SIGNIFICANT CHANGE UP (ref 8.5–10.1)
CHLORIDE SERPL-SCNC: 103 MMOL/L — SIGNIFICANT CHANGE UP (ref 96–108)
CO2 SERPL-SCNC: 23 MMOL/L — SIGNIFICANT CHANGE UP (ref 22–31)
COVID-19 NUCLEOCAPSID GAM AB INTERP: POSITIVE
COVID-19 NUCLEOCAPSID TOTAL GAM ANTIBODY RESULT: 4.14 INDEX — HIGH
COVID-19 SPIKE DOMAIN AB INTERP: POSITIVE
COVID-19 SPIKE DOMAIN ANTIBODY RESULT: >250 U/ML — HIGH
CREAT SERPL-MCNC: 1.54 MG/DL — HIGH (ref 0.5–1.3)
EOSINOPHIL # BLD AUTO: 0 K/UL — SIGNIFICANT CHANGE UP (ref 0–0.5)
EOSINOPHIL NFR BLD AUTO: 0 % — SIGNIFICANT CHANGE UP (ref 0–6)
GLUCOSE SERPL-MCNC: 214 MG/DL — HIGH (ref 70–99)
HCT VFR BLD CALC: 47.7 % — SIGNIFICANT CHANGE UP (ref 39–50)
HGB BLD-MCNC: 15.7 G/DL — SIGNIFICANT CHANGE UP (ref 13–17)
IMM GRANULOCYTES NFR BLD AUTO: 0.3 % — SIGNIFICANT CHANGE UP (ref 0–1.5)
LYMPHOCYTES # BLD AUTO: 0.57 K/UL — LOW (ref 1–3.3)
LYMPHOCYTES # BLD AUTO: 6.2 % — LOW (ref 13–44)
MCHC RBC-ENTMCNC: 27.8 PG — SIGNIFICANT CHANGE UP (ref 27–34)
MCHC RBC-ENTMCNC: 32.9 GM/DL — SIGNIFICANT CHANGE UP (ref 32–36)
MCV RBC AUTO: 84.4 FL — SIGNIFICANT CHANGE UP (ref 80–100)
MONOCYTES # BLD AUTO: 0.22 K/UL — SIGNIFICANT CHANGE UP (ref 0–0.9)
MONOCYTES NFR BLD AUTO: 2.4 % — SIGNIFICANT CHANGE UP (ref 2–14)
NEUTROPHILS # BLD AUTO: 8.35 K/UL — HIGH (ref 1.8–7.4)
NEUTROPHILS NFR BLD AUTO: 91 % — HIGH (ref 43–77)
PLATELET # BLD AUTO: 203 K/UL — SIGNIFICANT CHANGE UP (ref 150–400)
POTASSIUM SERPL-MCNC: 4 MMOL/L — SIGNIFICANT CHANGE UP (ref 3.5–5.3)
POTASSIUM SERPL-SCNC: 4 MMOL/L — SIGNIFICANT CHANGE UP (ref 3.5–5.3)
RBC # BLD: 5.65 M/UL — SIGNIFICANT CHANGE UP (ref 4.2–5.8)
RBC # FLD: 12.2 % — SIGNIFICANT CHANGE UP (ref 10.3–14.5)
SARS-COV-2 IGG+IGM SERPL QL IA: 4.14 INDEX — HIGH
SARS-COV-2 IGG+IGM SERPL QL IA: >250 U/ML — HIGH
SARS-COV-2 IGG+IGM SERPL QL IA: POSITIVE
SARS-COV-2 IGG+IGM SERPL QL IA: POSITIVE
SODIUM SERPL-SCNC: 136 MMOL/L — SIGNIFICANT CHANGE UP (ref 135–145)
WBC # BLD: 9.18 K/UL — SIGNIFICANT CHANGE UP (ref 3.8–10.5)
WBC # FLD AUTO: 9.18 K/UL — SIGNIFICANT CHANGE UP (ref 3.8–10.5)

## 2021-11-18 RX ORDER — ACETAMINOPHEN 500 MG
650 TABLET ORAL EVERY 6 HOURS
Refills: 0 | Status: DISCONTINUED | OUTPATIENT
Start: 2021-11-18 | End: 2021-11-18

## 2021-11-18 RX ORDER — LAMOTRIGINE 25 MG/1
2 TABLET, ORALLY DISINTEGRATING ORAL
Qty: 0 | Refills: 0 | DISCHARGE

## 2021-11-18 RX ORDER — CX-024414 0.2 MG/ML
0.5 INJECTION, SUSPENSION INTRAMUSCULAR
Qty: 0 | Refills: 0 | DISCHARGE

## 2021-11-18 RX ADMIN — Medication 650 MILLIGRAM(S): at 09:30

## 2021-11-18 RX ADMIN — Medication 650 MILLIGRAM(S): at 08:58

## 2021-11-18 RX ADMIN — OXYCODONE HYDROCHLORIDE 5 MILLIGRAM(S): 5 TABLET ORAL at 05:48

## 2021-11-18 RX ADMIN — FENTANYL CITRATE 50 MICROGRAM(S): 50 INJECTION INTRAVENOUS at 00:44

## 2021-11-18 RX ADMIN — Medication 100 GRAM(S): at 08:58

## 2021-11-18 RX ADMIN — OXYCODONE HYDROCHLORIDE 10 MILLIGRAM(S): 5 TABLET ORAL at 00:44

## 2021-11-18 NOTE — DISCHARGE NOTE PROVIDER - DISCHARGE DIET
eMERGENCY dEPARTMENT eNCOUnter      CHIEF COMPLAINT    Chief Complaint   Patient presents with   • Urinary Complaint       HPI    Nehemias Castillo is a 86 year old female with past medical history of dementia,  history paroxysmal A. fib with a Medtronic loop recorder in place, hypertension,  polymyalgia rheumatica, and renal insufficiency who presents to the emergency department chief complaint of urinary complaint. Patient reports her son and granddaughter for surgical emergency department today. Patient denies any complaints at this time. Patient's son who is her power of  and her granddaughter are both present in room. Family reports noticing a patient having increased bilateral lower extremity weakness this morning. They noticed that she has been urinating on herself more. They noticed a foul odor to her urine. He reports she was complaining of generalized body aches yesterday. Family notes patient was recently admitted on 6/18/18 and discharged on 6/27/18 after patient sustained A fall at home and was found down with rhabdomyolysis, dehydration, history of present illness, UTI. Her son reports this seems similar to the presentation before although he believes a \"may have caught it earlier.\" Patient denies any fever, abdominal pain, chest pain, shortness of breath, pain, dysuria, constipation, diarrhea, unilateral weakness, or other associated symptoms.     Patient is alert and oriented ×3, follows commands, responds to questions appropriately.    ALLERGIES    ALLERGIES:  No Known Allergies    CURRENT MEDICATIONS    Current Facility-Administered Medications   Medication Dose Route Frequency Provider Last Rate Last Dose   • sodium chloride (NORMAL SALINE) 0.9 % bolus 1,000 mL  1,000 mL Intravenous Once Alana Lemus PA-C         Current Outpatient Prescriptions   Medication Sig Dispense Refill   • memantine (NAMENDA XR) 28 MG extended-release capsule Take 1 capsule by mouth daily. 30 capsule 3   •  cyanocobalamin (VITAMIN B-12) 250 MCG tablet Take 1 tablet by mouth 1 day a week on Mondays. 30 tablet 0   • diclofenac (VOLTAREN) 1 % gel Apply 2 grams topically every 6 hours as needed. 100 g 0   • metoPROLOL succinate (TOPROL-XL) 25 MG 24 hr tablet Take 0.5 tablets by mouth daily. 30 tablet 0   • Omega 3-6-9 Fatty Acids (OMEGA 3-6-9 COMPLEX) capsule Take 1 capsule by mouth daily. 30 capsule 0   • amLODIPine (NORVASC) 2.5 MG tablet Take 1 tablet by mouth daily. 30 tablet 0   • amLODIPine (NORVASC) 5 MG tablet Take 1 tablet by mouth daily. 30 tablet 0   • cilostazol (PLETAL) 100 MG tablet Take 1 tablet by mouth 2 times daily. 30 tablet 0   • melatonin 5 MG Take 1 tablet by mouth daily at bedtime. 22 tablet 0   • Cholecalciferol 1000 units tablet Take 1 tablet by mouth daily.     • aspirin 81 MG tablet Take 1 tablet by mouth daily. 90 tablet 1       PAST MEDICAL HISTORY    Past Medical History:   Diagnosis Date   • Anemia    • Atrial fibrillation (CMS/HCC) 8/2014   • Cataract    • Dyslipidemia    • Essential (primary) hypertension    • Heart murmur     not sure, was told in past   • Osteoarthritis    • Polymyalgia rheumatica (CMS/HCC)    • Renal insufficiency    • Sciatica    • Syncope 7/2014   • Uncomplicated senile dementia     Plan to start at [facility] memory clinic 11/2014   • Vitamin D deficiency        SURGICAL HISTORY    Past Surgical History:   Procedure Laterality Date   • Dexa bone density axial skeleton  5/10/2013   • Echocardiogram  7/2014    EF 65%   • Eye surgery      bilateral cataract   • Hysterectomy       SOCIAL HISTORY    Social History     Social History   • Marital status:      Spouse name: N/A   • Number of children: 10   • Years of education: 10     Social History Main Topics   • Smoking status: Never Smoker   • Smokeless tobacco: Never Used   • Alcohol use No   • Drug use: No   • Sexual activity: No     Other Topics Concern   • Seat Belt Yes     Social History Narrative    EDUCATION  LEVEL: Completed 10 or 11th grade     OCCUPATION: Retired  worker    LIVING SITUATION: . Lives alone in a second floor apartment, with significant help from family. Independent with ADLs, but requires help with most IADLs    SOCIAL SUPPORT: Family. [employer]    ASSISTIVE DEVICE: None    ADAPTIVE DEVICES: Upper denture. Has [employer]     MEDICATION SETUP: Son and daughter manage medications    MEDICATION ADHERENCE: Good        ADVANCE DIRECTIVES: POA papers in Epic - redone on 1/31/2017. Primary POA is his son Arturo Castillo. Secondary POA are daughter Jillian Castillo and son Jairo Castillo. POA not activated.    CODE STATUS: Rediscussed on 1/31/2017. So far has been Full code. Now wants to consider DO NOT RESUSCITATE. Hayden Morris will continue discussions at home with other family members.        Updated by Mikaela Barajas on 1/31/2017       FAMILY HISTORY    Family History   Problem Relation Age of Onset   • Cancer Mother 60        throat, cervical   • Liver Disease Brother    • Hypertension Sister        REVIEW OF SYSTEMS    All review of systems negative except as noted  Constitutional:  Denies fever or chills.   Eyes:  Denies change in visual acuity.   HENT:  Denies nasal congestion or sore throat.   Respiratory:  Denies cough or shortness of breath.   Cardiovascular:  Denies chest pain or edema.   GI:  Denies abdominal pain, nausea, vomiting, constipation or diarrhea.   :  Denies dysuria. + urine odor.  Musculoskeletal: Bilateral LE weakness. Denies back pain or joint pain.   Integument:  Denies rash.   Neurologic:  Denies headache, focal weakness, or sensory changes.   Endocrine:  Denies polyuria or polydipsia.   Lymphatic:  Denies swollen glands.   Psychiatric:  Denies depression or anxiety. Denies suicidal ideation or homicidal ideation.    PHYSICAL EXAM    Vitals:    08/23/18 1303   BP: 158/87   Pulse: 78   Resp: 18   Temp: 98.9 °F (37.2 °C)   TempSrc: Oral    SpO2: 98%   Weight: 63.5 kg       Pulse Ox Interpretation:  98% on RA, normal.  Constitutional:  Well developed, well nourished. No acute distress, non-toxic appearance. Smells of strong urine odor.   Eyes:  PERRL, conjunctivae normal.   HENT:  Head is atraumatic. External ears without lesions. Nose midline. Oropharynx moist.  Neck: Normal range of motion. No tenderness. Supple.   Respiratory:  No respiratory distress, normal breath sounds. No rales. No wheezing.   Cardiovascular:  Normal rate, normal rhythm. No murmurs, gallops, or rubs.  GI:  Soft, non-distended, nontender. Normal bowel sounds. No hepatomegaly, splenomegaly, masses, rebound tenderness, or guarding.   :  No costovertebral angle tenderness.   Musculoskeletal:  No edema, tenderness, or deformities.   Integument:  Well hydrated, no rash.   Lymphatic:  No lymphadenopathy noted.   Neurologic:  Patient appears disoriented and will ask why she is here or what date it is. She follows commands and answers questions appropriately. Normal motor function. Normal sensory function. No focal deficits noted.   Psychiatric:   Speech and behavior appropriate.     RADIOLOGY    No orders to display     LABS    Results for orders placed or performed during the hospital encounter of 08/23/18   CBC & Auto Differential   Result Value    WBC 9.2    RBC 3.71 (L)    HGB 11.3 (L)    HCT 33.3 (L)    MCV 89.8    MCH 30.5    MCHC 33.9    RDW-CV 13.8     (H)    NRBC 0    DIFF TYPE AUTOMATED DIFFERENTIAL    Neutrophil 76    LYMPH 14    MONO 8    EOSIN 2    BASO 0    Absolute Neutrophil 7.0    Absolute Lymph 1.3    Absolute Mono 0.7    Absolute Eos 0.2    Absolute Baso 0.0   Basic Metabolic Panel   Result Value    Sodium 140    Potassium 3.1 (L)    Chloride 100    Carbon Dioxide 28    Anion Gap 15    Glucose 120 (H)    BUN 15    Creatinine 1.08 (H)    GFR Estimate,  55     Comment: eGFR 30-59 mL/min/1.73m2 = Moderate decrease in kidney function. Stage 3  CKD (chronic kidney disease) or moderate kidney disease.    GFR Estimate, Non  47     Comment: eGFR 30-59 mL/min/1.73m2 = Moderate decrease in kidney function. Stage 3 CKD (chronic kidney disease) or moderate kidney disease.    BUN/Creatinine Ratio 14    CALCIUM 9.1   Macro Urine - Point of Care   Result Value    COLOR-POC YELLOW    APPEARANCE-POC HAZY    GLUCOSE-POC NEGATIVE    BILIRUBIN-POC SMALL (A)    KETONES-POC TRACE (A)    SPECIFIC GRAVITY-POC 1.025    OCCULT BLOOD-POC TRACE (A)    PH-POC 6.0    PROTEIN-POC 30 (A)    UROBILINOGEN-POC 2.0 (H)    NITRITE-POC POSITIVE (A)    WBC (Leukocyte) Esterase POC NEGATIVE     Medications   sodium chloride (NORMAL SALINE) 0.9 % bolus 1,000 mL (1,000 mLs Intravenous New Bag 8/23/18 1553)   cephalexin (KEFLEX) capsule 500 mg (not administered)   potassium chloride (KLOR-CON M) fernando ER tablet 20 mEq (20 mEq Oral Given 8/23/18 1553)     ED COURSE   1428: CBC WNL, stable anemia consistent with prior.  1438: BMP reveals hypokalemia at 3.1. Oral K supplemented.  1450: Prompted patient for urine, patient feels urge to pee, tech at bedside with commode.  1513: UA returned + for nitrites, will send for complete UA and culture.  Urine is grossly dirty and concerning for UTI.   1530: Spoke with Dr. Barajas regarding patient's UA concerning for UTI with increased delirium and weakness. He recommends IVF and waiting for UA complete and call back in 30 minutes or so to determine if patient improves and needs admission for observation.   1615: Reassessed patient. Patient continues to have delirium regarding why she is here although responds appropriately to questions. UA complete pending due to lab delay. Patient unchanged in status after IVF. Dr. Barajas reconsulted.   1626: Spoke with Dr. Barajas who accepted patient for admission for observation. He is aware that complete UA is pending at this time. Urine culture is pending as well. All questions answered. No further  recommendations.    MEDICAL DECISION MAKING    Nehemias Castillo is a 86 year old female with past medical history of dementia,  history paroxysmal A. fib with a Medtronic loop recorder in place, hypertension,  polymyalgia rheumatica, and renal insufficiency who presents to the emergency department chief complaint of urinary complaint. Vital signs normal. Patient is afebrile. Patient is alert and oriented and responds to questions appropriately, follows commands without difficulty. Patient's family reports she is not acting like her normal self. History of strong odor to urine with increased delirium likely secondary to acute UTI. Will obtain labs, UA and reassess. Patient is alert and oriented with no focal neurological deficits to suggest intracranial lesion or pathology, chart reviewed from previous admission showing subtle and stable 0.5cm subdural hematoma with no change after repeat imaging. Patient transiently complaining of left shoulder pain when reaching behind her, patient noted to have extensive left rotator cuff tear and received steroid injection at past admission.     Patient had positive nitrate and dirty, foul odor to urine concerning for acute UTI. Patient is afebrile. Labs without leukocytosis. Labs show mild hypokalemia which was repleted with p.o. K. IV fluid and p.o. Keflex started. Patient continues to have delirium on top of baseline dementia with new increased bilateral lower extremity weakness. Patient was accepted for admission for observation under Dr. Barajas. Patient and family members present who agree with plan of care. All questions answered.      Impression:  The primary encounter diagnosis was Acute cystitis without hematuria. A diagnosis of Generalized weakness was also pertinent to this visit.    Follow Up:  No follow-up provider specified.     New Prescriptions    No medications on file       Patient seen under direct supervision of Dr. Park.   Alana Lemus PA-C.  Date  8/23/18  3:22 PM       Alana Lemus PA-C  08/23/18 0121     Regular Diet - No restrictions

## 2021-11-18 NOTE — DISCHARGE NOTE NURSING/CASE MANAGEMENT/SOCIAL WORK - PATIENT PORTAL LINK FT
You can access the FollowMyHealth Patient Portal offered by [facility] by registering at the following website: [email or web address]. By joining [facility]’s FollowMyHealth portal, you will also be able to view your health information using other applications (apps) compatible with our system.

## 2021-11-18 NOTE — DISCHARGE NOTE PROVIDER - HOSPITAL COURSE
Subjective:  Patient is a 45y old  Male who presents with a chief complaint of Abdominal pain, gallstones (17 Nov 2021 10:32)    HPI:  46 yo male with hx of gallstones and many years of recurrent biliary colic, presents to ER with severe gallbladder attack. Pt last had severe attack on Labor day weekend. Now presents with post prandial pain, radiating to back, associated with nausea. Was to be scheduled initially for surgery, but today's attack worse than prior ones. Had ultrasound at [facility] in August showing gallbladder packed with stones. (17 Nov 2021 10:32)    S/P laparoscopic Cholecystectomy, Uneventful hospital stay.

## 2021-11-18 NOTE — DISCHARGE NOTE PROVIDER - CARE PROVIDER_API CALL
Vlad Carranza  SURGERY  [address] [place] [address]  Phone: [phone]  Fax: [phone]  Follow Up Time: 1 week

## 2021-11-18 NOTE — DISCHARGE NOTE PROVIDER - NSDCMRMEDTOKEN_GEN_ALL_CORE_FT
ascorbic acid 500 mg oral tablet: 1 tab(s) orally once a day  folic acid 1 mg oral tablet: 1 tab(s) orally once a day  oxycodone-acetaminophen 5 mg-325 mg oral tablet: 1 tab(s) orally every 6 hours MDD:4 tabs  rizatriptan 10 mg oral tablet: 1 tab(s) orally once as needed for headache  Saxenda 18 mg/3 mL subcutaneous solution: Inject daily as directed  Vitamin D3 25 mcg (1000 intl units) oral tablet: 1 tab(s) orally once a day

## 2021-11-22 DIAGNOSIS — K80.12 CALCULUS OF GALLBLADDER WITH ACUTE AND CHRONIC CHOLECYSTITIS WITHOUT OBSTRUCTION: ICD-10-CM

## 2022-01-03 ENCOUNTER — TRANSCRIPTION ENCOUNTER (OUTPATIENT)
Age: 47
End: 2022-01-03

## 2022-05-11 ENCOUNTER — FORM ENCOUNTER (OUTPATIENT)
Age: 47
End: 2022-05-11

## 2022-09-23 NOTE — DISCHARGE NOTE NURSING/CASE MANAGEMENT/SOCIAL WORK - HAS THE PATIENT RECEIVED THE INFLUENZA VACCINE THIS SEASON?
no... Rotation Flap Text: The defect edges were debeveled with a #15 scalpel blade.  Given the location of the defect, shape of the defect and the proximity to free margins a rotation flap was deemed most appropriate.  Using a sterile surgical marker, an appropriate rotation flap was drawn incorporating the defect and placing the expected incisions within the relaxed skin tension lines where possible.    The area thus outlined was incised deep to adipose tissue with a #15 scalpel blade.  The skin margins were undermined to an appropriate distance in all directions utilizing iris scissors.

## 2022-10-25 ENCOUNTER — NON-APPOINTMENT (OUTPATIENT)
Age: 47
End: 2022-10-25

## 2022-10-25 ENCOUNTER — APPOINTMENT (OUTPATIENT)
Dept: DERMATOLOGY | Facility: CLINIC | Age: 47
End: 2022-10-25

## 2022-10-25 DIAGNOSIS — L30.9 DERMATITIS, UNSPECIFIED: ICD-10-CM

## 2022-10-25 DIAGNOSIS — D22.9 MELANOCYTIC NEVI, UNSPECIFIED: ICD-10-CM

## 2022-10-25 DIAGNOSIS — Z80.8 FAMILY HISTORY OF MALIGNANT NEOPLASM OF OTHER ORGANS OR SYSTEMS: ICD-10-CM

## 2022-10-25 PROCEDURE — 99204 OFFICE O/P NEW MOD 45 MIN: CPT

## 2022-11-21 ENCOUNTER — APPOINTMENT (OUTPATIENT)
Dept: NEUROLOGY | Facility: CLINIC | Age: 47
End: 2022-11-21

## 2022-11-21 VITALS
BODY MASS INDEX: 37.93 KG/M2 | SYSTOLIC BLOOD PRESSURE: 121 MMHG | HEIGHT: 72 IN | WEIGHT: 280 LBS | DIASTOLIC BLOOD PRESSURE: 82 MMHG | HEART RATE: 58 BPM | TEMPERATURE: 97.8 F

## 2022-11-21 DIAGNOSIS — G40.109 LOCALIZATION-RELATED (FOCAL) (PARTIAL) SYMPTOMATIC EPILEPSY AND EPILEPTIC SYNDROMES WITH SIMPLE PARTIAL SEIZURES, NOT INTRACTABLE, W/OUT STATUS EPILEPTICUS: ICD-10-CM

## 2022-11-21 PROCEDURE — 99204 OFFICE O/P NEW MOD 45 MIN: CPT

## 2022-11-21 RX ORDER — TIRZEPATIDE 5 MG/.5ML
5 INJECTION, SOLUTION SUBCUTANEOUS
Refills: 0 | Status: ACTIVE | COMMUNITY

## 2022-11-21 RX ORDER — RIZATRIPTAN BENZOATE 10 MG/1
10 TABLET ORAL
Qty: 12 | Refills: 5 | Status: ACTIVE | COMMUNITY
Start: 2022-10-25 | End: 1900-01-01

## 2022-11-21 RX ORDER — TERBINAFINE HYDROCHLORIDE 250 MG/1
250 TABLET ORAL
Qty: 30 | Refills: 0 | Status: ACTIVE | COMMUNITY
Start: 2022-10-25

## 2022-11-21 RX ORDER — LAMOTRIGINE 200 MG/1
200 TABLET ORAL
Refills: 0 | Status: DISCONTINUED | COMMUNITY
End: 2022-11-21

## 2022-11-21 RX ORDER — LAMOTRIGINE 200 MG/1
200 TABLET, EXTENDED RELEASE ORAL
Qty: 180 | Refills: 3 | Status: ACTIVE | COMMUNITY
Start: 2022-09-28 | End: 1900-01-01

## 2022-11-21 NOTE — DATA REVIEWED
[de-identified] :  EXAM:  CT BRAIN                      \par \par \par PROCEDURE DATE:  09/10/2018  \par \par \par \par INTERPRETATION:  CLINICAL INFORMATION: Headache.\par \par TECHNIQUE:\par Axial CT images were acquired through the head.\par Intravenous contrast: None\par Two-dimensional reformats were generated.\par \par COMPARISON STUDY: None\par \par FINDINGS: \par There is no CT evidence of acute intracranial hemorrhage,  mass effect, \par midline shift or acute territorial infarct. The basal cisterns are patent.\par \par The ventricles and sulci are normal in size.  \par \par The visualized paranasal sinuses are clear.\par \par The mastoid air cells and middle ear cavities are clear.\par \par The calvarium, skull base, and extracranial soft tissues are unremarkable.\par \par IMPRESSION: \par No CT evidence of acute intracranial hemorrhage, mass effect or acute \par territorial infarct.  Follow-up MRI can be performed as clinically \par warranted.\par  [de-identified] :   EXAM:  EEG AWAKE AND DROWSY  \par \par \par PROCEDURE DATE:  10/22/2020  \par \par \par INTERPRETATION:  Health\par [facility]\par Report of Routine EEG\par \par [address] [place] [address]\par \par Patient Name: YAYA STANTON\par Age: 44 year, : 1975\par MRN #: [id number]\par Referring Physician: YESENIA\par EEG #: \par \par Study Date: 10/22/2020\par \par Study Information:\par \par EEG Recording Technique:\par The patient underwent routine EEG using Telemetry System hardware on the XLTek Digital System.  EEG data was stored on a computer hard drive.\par \par EEG Placement and Labeling of Electrodes:\par The EEG was performed utilizing 16  channel referential EEG connections (coronal over temporal over parasagittal montage) using all standard 10-20 electrode placements with EKG.  Recording was at a sampling rate of 256 samples per second per channel.\par History:\par EEG performed for evaluation of paroxysmal neurological dysfunction, ie. spells, confusion, AMS, LOC, and/or concern for seizure R.56.9. HX: LAST SEIZURE, , LAST FEW WEEKS FREQUENT AURAS: WEIRD FEELINGS AND THOUHGTS,\par \par PS: YES                                            HANDEDNESS: RIGHT\par HV: NO\par \par MEDS: LAMOTRIGINE,\par \par COMMENTS: AWAKE, ALERT, ANSWER ALL QUESTIONS CORRECTLY, FOLLOWS COMMANDS, RELAX, FREQUENT EYES BLINKING,\par \par \par \par Medication\par Lamotrigine\par \par Interpretation:\par \par \par \par FINDINGS:  The background was continuous, spontaneously variable and reactive.  During wakefulness, the posteriorly dominant alpha rhythm consisted of symmetric, well-modulated 9-10 Hz activity, with amplitude to 40 uV, that attenuated to eye opening.  Low amplitude frontal beta was noted in wakefulness.\par \par Background Slowing:\par No generalized background slowing was present.\par \par Focal Slowing:\par None was present.\par \par Sleep Background:\par Drowsiness was characterized by fragmentation, attenuation, and slowing of the background activity.\par Stage 2 Sleep was not captured.\par \par Other Non-Epileptiform Findings:\par There are rare, right temporal sharp waves that are not clearly epileptiform.\par \par Interictal Epileptiform Activity:\par None were present.\par \par Activation Procedures:\par Hyperventilation was not performed.\par Photic stimulation was performed and did not induce any abnormalities.\par \par Artifacts:\par Intermittent myogenic and movement artifacts were noted.\par \par ECG:\par The single channel ECG recording did not show any significant arrhythmias.\par \par \par EEG Summary/Classification:\par This was an essentially normal EEG study in the awake and drowsy states.\par There are rare right temporal sharp waves which are not clearly epileptiform and are of uncertain clinical significance.\par \par EEG Impression/Clinical Correlate:\par No definite epileptiform activity was seen and no clinical events or seizures were recorded. Consider a repeat study if clinically indicated.\par \par \par ________________________________________\par Sabrina Joseph MD\par Attending Physician\par \par \par

## 2022-11-21 NOTE — REVIEW OF SYSTEMS
[As Noted in HPI] : as noted in HPI [Seizures] : convulsions [Migraine Headache] : migraine headaches [Negative] : Heme/Lymph

## 2022-11-21 NOTE — DISCUSSION/SUMMARY
[FreeTextEntry1] : Inhaler46-year-old man history of, stable, intermittent, sometimes many months between headaches.  With excellent resolution with Maxalt 10 mg.\par Reviewed and discussed headache management and treatment.  Trigger management.\par Plan: We will renew Maxalt 10 mg p.o. as needed, can repeat in 2 hours.  Maximum dose of 30 mg/day.  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  No reported side effect.\par Plan: Advised patient to get copies of previous evaluation by his retired physician.\par Will continue lamotrigine  mg, 2 tablets p.o. daily\par Obtain lamotrigine serum levels.\par Return to office, 6 months. [Well-controlled] : well-controlled [Complex Partial] : complex partial [Idiopathic] : idiopathic  [Risks Associated with Driving/[place] Law] : As per my usual protocol, the patient was advised in regards to risks and driving privileges associated with the [place] Guidelines.  [Safety Recommendations] : The patient was advised in regards to the risk of seizures and general seizure safety recommendations including not to be bathing alone, climbing to high places and operating heavy machinery. [Compliance with Medications] : The importance of compliance with medications was reinforced. [Medication Side Effects] : High frequency and serious potential medication adverse effects were reviewed with the patient, including but not exclusive to psychiatric effects.  Information sheets on medication side effects were made available to the patient in our clinic.  The patient or advocate agrees to notify us for any concerns. [Bone Health Education] : Patient was educated in regards to bone health and an increased risk of osteoporosis in patients with epilepsy. [Sleep Hygiene/Sleep Disruption Risks] : Sleep hygiene and the risks of sleep disruption were discussed.

## 2022-11-21 NOTE — HISTORY OF PRESENT ILLNESS
[Right Handed] : right handed [Stress] : stress [Sleep Deprivation] : sleep deprivation [Missed Medications] : missed medications [___ per year] : [unfilled] times per year [Lamotrigine (Lamictal)] : lamotrigine (Lamictal) [Oxcarbazepine (Trileptal)] : oxcarbazepine (Trileptal) [Grand Mal Status Epilepticus] : no [] : no [Family History of Seizures] : no family history of seizures [Lesional] : nonlesional [ Complications] : ~T no  complications [Head Trauma] : no head trauma [Febrile Seizures] : no febrile seizures [Meningitis or Encephalitis] : no meningitis or encephalitis [Developmental Delay] : no developmental delay [Stroke] : no stroke  [FreeTextEntry1] : 20 years  [FreeTextEntry2] : partial  [FreeTextEntry5] : mild [FreeTextEntry6] : 30 seconds [FreeTextEntry7] : random [de-identified] : 2017 [de-identified] : none

## 2022-12-07 ENCOUNTER — TRANSCRIPTION ENCOUNTER (OUTPATIENT)
Age: 47
End: 2022-12-07

## 2023-01-24 ENCOUNTER — NON-APPOINTMENT (OUTPATIENT)
Age: 48
End: 2023-01-24

## 2023-01-24 ENCOUNTER — APPOINTMENT (OUTPATIENT)
Dept: ORTHOPEDIC SURGERY | Facility: CLINIC | Age: 48
End: 2023-01-24
Payer: COMMERCIAL

## 2023-01-24 DIAGNOSIS — M75.81 OTHER SHOULDER LESIONS, RIGHT SHOULDER: ICD-10-CM

## 2023-01-24 DIAGNOSIS — S43.431A SUPERIOR GLENOID LABRUM LESION OF RIGHT SHOULDER, INITIAL ENCOUNTER: ICD-10-CM

## 2023-01-24 DIAGNOSIS — M75.82 OTHER SHOULDER LESIONS, LEFT SHOULDER: ICD-10-CM

## 2023-01-24 PROCEDURE — 73030 X-RAY EXAM OF SHOULDER: CPT | Mod: 50

## 2023-01-24 PROCEDURE — 99204 OFFICE O/P NEW MOD 45 MIN: CPT | Mod: 25

## 2023-01-24 PROCEDURE — 20611 DRAIN/INJ JOINT/BURSA W/US: CPT | Mod: LT

## 2023-01-24 NOTE — ADDENDUM
[FreeTextEntry1] : Documented by Meeta Chen acting as a scribe for Dr. Tee and Chauncey Lanier PA-C on 01/24/2023.   All medical record entries made by the Scribe were at my, Dr. Tee, and Chauncey Lanier's, direction and personally dictated by me on 01/24/2023. I have reviewed the chart and agree that the record accurately reflects my personal performance of the history, physical exam, procedure and imaging.

## 2023-01-24 NOTE — DISCUSSION/SUMMARY
[de-identified] : We had a thorough discussion regarding the nature of his pain, the pathophysiology, as well as all treatment options. Based on clinical exam and radiograph findings he has rotator cuff tendinitis of the left and right shoulder. Pt due to his acute pain elected for an ultrasound guided corticosteroid injection at today's visit and tolerated the procedure well. He should take it easy for the next 2-3 days while icing the joint. Patient was given prescription of formal physical therapy that he will perform 2x/wk for 6-8 wks. Patient will follow up in 6-8 wks for repeat clinical assessment. All questions were answered and the patient verbalized understanding. The patient is in agreement with this treatment plan.

## 2023-01-24 NOTE — PHYSICAL EXAM
[de-identified] : Physical Exam: \par General: Well appearing, no acute distress \par Neurologic: A&Ox3, No focal deficits \par Head: NCAT without abrasions, lacerations, or ecchymosis to head, face, or scalp \par Eyes: No scleral icterus, no gross abnormalities \par Respiratory: Equal chest wall expansion bilaterally, no accessory muscle use \par Lymphatic: No lymphadenopathy palpated \par Skin: Warm and dry \par Psychiatric: Normal mood and affect\par \par Examination of the Left shoulder shows no obvious deformity, swelling or erythema. Mild tenderness to palpation over the anterior shoulder. No AC joint tenderness. The patient demonstrates active/passive ROM of Forward Flexion to 165 degrees with hitching, External Rotation to 80 degrees and Internal Rotation to a mid lumbar level. The patient has a positive Livingston and Neers test. No pain with cross body adduction, lift off testing, AC compression testing or Yergason testing. The patient has 4/5 strength to forward flexion with pronation, internal and external rotation. Compartments are soft and nontender. The patient has 2+ cap refill and sensation is intact in the hand. \par \par Examination of the Right shoulder shows no obvious deformity, swelling or erythema. Mild tenderness to palpation over the anterior shoulder. No AC joint tenderness. The patient demonstrates active/passive ROM of Forward Flexion to 165 degrees with hitching, External Rotation to 80 degrees and Internal Rotation to a mid lumbar level. The patient has a positive Livingston and Neers test. No pain with cross body adduction, lift off testing, AC compression testing or Yergason testing. The patient has 4/5 strength to forward flexion with pronation, internal and external rotation. Compartments are soft and nontender. The patient has 2+ cap refill and sensation is intact in the hand. \par  [de-identified] : The following radiographs were ordered and read by me during this patients visit. I reviewed each radiograph in detail with the patient and discussed the findings as highlighted below.   \par \par 4 views of the left shoulder show no fracture, dislocation or bony lesions. There is no evidence of degenerative change in the glenohumeral and acromioclavicular joints with maintenance of the joint space. Otherwise unremarkable. \par \par 4 views of the right shoulder show no fracture, dislocation or bony lesions. There is no evidence of degenerative change in the glenohumeral and acromioclavicular joints with maintenance of the joint space. Otherwise unremarkable. \par

## 2023-01-24 NOTE — PROCEDURE
[de-identified] : Injection: US guided Left shoulder (Subacromial).    \par \par A discussion was had with the patient regarding this procedure and all questions were answered. All risks, benefits and alternatives were discussed. These included but were not limited to bleeding, infection, and allergic reaction. Alcohol was used to clean the skin, and ChloraPrep was used to sterilize and prep the area in the posterior aspect of the left shoulder. Ethyl chloride spray was then used as a topical anesthetic. A 22-gauge needle was used to inject 3cc 1% xylocaine, 2cc 0.25% bupivacaine and 1cc of 40mg/mL triamcinolone acetonide into the left subacromial space. Ultrasound guidance was used for localization. A sterile band aid was then applied. The patient tolerated the procedure well and there were no complications. Post injection instructions were given.

## 2023-01-24 NOTE — HISTORY OF PRESENT ILLNESS
[Worsening] : worsening [___ mths] : [unfilled] month(s) ago [de-identified] : YAYA is a 47 year male who presents today with complaints of bilateral shoulder pain L>R. Patient reports pain has occurred for approximately 6 months. Patient denies any JOSEPH. Patient reports pain is intermittent. States he has difficulty when reaching out and picking up objects.  In terms of his right shoulder he had a dislocation 15 years ago.  He reports having an MRI which showed a partial rotator cuff tear about 7 or 8 years ago.  He states right shoulder pain is very mild.  The left side is waking him up at night and is worsening.\par

## 2023-02-28 ENCOUNTER — APPOINTMENT (OUTPATIENT)
Dept: DERMATOLOGY | Facility: CLINIC | Age: 48
End: 2023-02-28
Payer: COMMERCIAL

## 2023-02-28 PROCEDURE — 99214 OFFICE O/P EST MOD 30 MIN: CPT

## 2023-02-28 RX ORDER — BETAMETHASONE DIPROPIONATE 0.5 MG/G
0.05 CREAM, AUGMENTED TOPICAL
Qty: 1 | Refills: 1 | Status: ACTIVE | COMMUNITY
Start: 2022-10-25 | End: 1900-01-01

## 2023-04-25 ENCOUNTER — APPOINTMENT (OUTPATIENT)
Dept: DERMATOLOGY | Facility: CLINIC | Age: 48
End: 2023-04-25
Payer: COMMERCIAL

## 2023-04-25 DIAGNOSIS — L50.8 OTHER URTICARIA: ICD-10-CM

## 2023-04-25 PROCEDURE — 99213 OFFICE O/P EST LOW 20 MIN: CPT

## 2023-05-16 ENCOUNTER — APPOINTMENT (OUTPATIENT)
Dept: NEUROLOGY | Facility: CLINIC | Age: 48
End: 2023-05-16

## 2023-05-18 ENCOUNTER — APPOINTMENT (OUTPATIENT)
Dept: NEUROLOGY | Facility: CLINIC | Age: 48
End: 2023-05-18

## 2023-10-24 ENCOUNTER — APPOINTMENT (OUTPATIENT)
Dept: DERMATOLOGY | Facility: CLINIC | Age: 48
End: 2023-10-24

## 2023-10-31 ENCOUNTER — APPOINTMENT (OUTPATIENT)
Dept: OTOLARYNGOLOGY | Facility: CLINIC | Age: 48
End: 2023-10-31
Payer: COMMERCIAL

## 2023-10-31 VITALS — HEIGHT: 72 IN | BODY MASS INDEX: 24.92 KG/M2 | WEIGHT: 184 LBS

## 2023-10-31 DIAGNOSIS — H90.A22 SENSORINEURAL HEARING LOSS, UNILATERAL, LEFT EAR, WITH RESTRICTED HEARING ON THE CONTRALATERAL SIDE: ICD-10-CM

## 2023-10-31 DIAGNOSIS — H93.13 TINNITUS, BILATERAL: ICD-10-CM

## 2023-10-31 DIAGNOSIS — H69.93 UNSPECIFIED EUSTACHIAN TUBE DISORDER, BILATERAL: ICD-10-CM

## 2023-10-31 DIAGNOSIS — R43.2 PARAGEUSIA: ICD-10-CM

## 2023-10-31 PROCEDURE — 92567 TYMPANOMETRY: CPT

## 2023-10-31 PROCEDURE — 31231 NASAL ENDOSCOPY DX: CPT

## 2023-10-31 PROCEDURE — 92557 COMPREHENSIVE HEARING TEST: CPT

## 2023-10-31 PROCEDURE — 99204 OFFICE O/P NEW MOD 45 MIN: CPT | Mod: 25

## 2023-10-31 RX ORDER — FLUTICASONE PROPIONATE 50 UG/1
50 SPRAY, METERED NASAL DAILY
Qty: 1 | Refills: 5 | Status: ACTIVE | COMMUNITY
Start: 2023-10-31 | End: 1900-01-01

## 2023-11-09 ENCOUNTER — APPOINTMENT (OUTPATIENT)
Dept: DERMATOLOGY | Facility: CLINIC | Age: 48
End: 2023-11-09
Payer: COMMERCIAL

## 2023-11-09 VITALS — HEIGHT: 72 IN | BODY MASS INDEX: 24.79 KG/M2 | WEIGHT: 183 LBS

## 2023-11-09 DIAGNOSIS — D23.9 OTHER BENIGN NEOPLASM OF SKIN, UNSPECIFIED: ICD-10-CM

## 2023-11-09 DIAGNOSIS — L81.4 OTHER MELANIN HYPERPIGMENTATION: ICD-10-CM

## 2023-11-09 DIAGNOSIS — D18.01 HEMANGIOMA OF SKIN AND SUBCUTANEOUS TISSUE: ICD-10-CM

## 2023-11-09 DIAGNOSIS — D22.9 MELANOCYTIC NEVI, UNSPECIFIED: ICD-10-CM

## 2023-11-09 DIAGNOSIS — Z12.83 ENCOUNTER FOR SCREENING FOR MALIGNANT NEOPLASM OF SKIN: ICD-10-CM

## 2023-11-09 PROCEDURE — 99213 OFFICE O/P EST LOW 20 MIN: CPT

## 2024-06-05 NOTE — ED ADULT NURSE NOTE - NS ED NOTE ABUSE SUSPICION NEGLECT YN
Regarding: Acute diarrhea  ----- Message from Deep MARROQUIN sent at 6/5/2024  9:35 AM EDT -----  Patient mom calling with concerns of acute diarrhea. Pt mom states believes he found bottle with spoiled milk under bed and drank it. Pt vomited once and has been having loose stools. Please advise.     No

## 2024-10-17 ENCOUNTER — APPOINTMENT (OUTPATIENT)
Dept: ORTHOPEDIC SURGERY | Facility: CLINIC | Age: 49
End: 2024-10-17
Payer: COMMERCIAL

## 2024-10-17 VITALS — BODY MASS INDEX: 24.38 KG/M2 | HEIGHT: 72 IN | WEIGHT: 180 LBS

## 2024-10-17 DIAGNOSIS — M76.71 PERONEAL TENDINITIS, RIGHT LEG: ICD-10-CM

## 2024-10-17 PROCEDURE — 99203 OFFICE O/P NEW LOW 30 MIN: CPT

## 2024-10-24 ENCOUNTER — APPOINTMENT (OUTPATIENT)
Dept: ORTHOPEDIC SURGERY | Facility: CLINIC | Age: 49
End: 2024-10-24

## 2024-10-24 VITALS — BODY MASS INDEX: 24.38 KG/M2 | HEIGHT: 72 IN | WEIGHT: 180 LBS

## 2024-10-24 DIAGNOSIS — M75.30 CALCIFIC TENDINITIS OF UNSPECIFIED SHOULDER: ICD-10-CM

## 2024-10-24 DIAGNOSIS — M75.51 BURSITIS OF RIGHT SHOULDER: ICD-10-CM

## 2024-10-24 DIAGNOSIS — M47.816 SPONDYLOSIS W/OUT MYELOPATHY OR RADICULOPATHY, LUMBAR REGION: ICD-10-CM

## 2024-10-24 PROCEDURE — 72100 X-RAY EXAM L-S SPINE 2/3 VWS: CPT

## 2024-10-24 PROCEDURE — 99213 OFFICE O/P EST LOW 20 MIN: CPT | Mod: 25

## 2024-10-24 PROCEDURE — 73030 X-RAY EXAM OF SHOULDER: CPT | Mod: RT

## 2024-11-12 ENCOUNTER — APPOINTMENT (OUTPATIENT)
Dept: DERMATOLOGY | Facility: CLINIC | Age: 49
End: 2024-11-12
Payer: COMMERCIAL

## 2024-11-12 DIAGNOSIS — D18.01 HEMANGIOMA OF SKIN AND SUBCUTANEOUS TISSUE: ICD-10-CM

## 2024-11-12 DIAGNOSIS — Z12.83 ENCOUNTER FOR SCREENING FOR MALIGNANT NEOPLASM OF SKIN: ICD-10-CM

## 2024-11-12 DIAGNOSIS — L50.8 OTHER URTICARIA: ICD-10-CM

## 2024-11-12 DIAGNOSIS — D22.9 MELANOCYTIC NEVI, UNSPECIFIED: ICD-10-CM

## 2024-11-12 DIAGNOSIS — L81.4 OTHER MELANIN HYPERPIGMENTATION: ICD-10-CM

## 2024-11-12 PROCEDURE — 99213 OFFICE O/P EST LOW 20 MIN: CPT

## 2025-01-29 ENCOUNTER — NON-APPOINTMENT (OUTPATIENT)
Age: 50
End: 2025-01-29
